# Patient Record
Sex: FEMALE | Race: WHITE | Employment: UNEMPLOYED | URBAN - NONMETROPOLITAN AREA
[De-identification: names, ages, dates, MRNs, and addresses within clinical notes are randomized per-mention and may not be internally consistent; named-entity substitution may affect disease eponyms.]

---

## 2017-01-28 ENCOUNTER — OFFICE VISIT (OUTPATIENT)
Dept: PRIMARY CARE CLINIC | Age: 47
End: 2017-01-28

## 2017-01-28 VITALS
WEIGHT: 152.4 LBS | BODY MASS INDEX: 26.02 KG/M2 | SYSTOLIC BLOOD PRESSURE: 110 MMHG | OXYGEN SATURATION: 98 % | TEMPERATURE: 97.6 F | HEIGHT: 64 IN | DIASTOLIC BLOOD PRESSURE: 70 MMHG | RESPIRATION RATE: 14 BRPM | HEART RATE: 66 BPM

## 2017-01-28 DIAGNOSIS — R11.0 NAUSEA: ICD-10-CM

## 2017-01-28 DIAGNOSIS — K52.9 GASTROENTERITIS: Primary | ICD-10-CM

## 2017-01-28 PROCEDURE — 99202 OFFICE O/P NEW SF 15 MIN: CPT | Performed by: PHYSICIAN ASSISTANT

## 2017-01-28 RX ORDER — ONDANSETRON 8 MG/1
8 TABLET, ORALLY DISINTEGRATING ORAL EVERY 8 HOURS PRN
Qty: 10 TABLET | Refills: 0 | Status: SHIPPED | OUTPATIENT
Start: 2017-01-28 | End: 2022-06-06

## 2017-01-28 ASSESSMENT — ENCOUNTER SYMPTOMS
SHORTNESS OF BREATH: 0
ABDOMINAL PAIN: 0
NAUSEA: 1
SORE THROAT: 0
VOMITING: 1
COUGH: 0
ABDOMINAL DISTENTION: 0
DIARRHEA: 0

## 2019-04-29 ENCOUNTER — HOSPITAL ENCOUNTER (OUTPATIENT)
Age: 49
Discharge: HOME | End: 2019-04-29
Payer: COMMERCIAL

## 2019-04-29 ENCOUNTER — HOSPITAL ENCOUNTER (OUTPATIENT)
Dept: GENERAL RADIOLOGY | Age: 49
Discharge: HOME | End: 2019-04-29
Payer: COMMERCIAL

## 2019-04-29 ENCOUNTER — HOSPITAL ENCOUNTER (OUTPATIENT)
Age: 49
End: 2019-04-29

## 2019-04-29 DIAGNOSIS — M54.2 NECK PAIN: ICD-10-CM

## 2019-04-29 PROCEDURE — 72040 X-RAY EXAM NECK SPINE 2-3 VW: CPT

## 2021-03-29 ENCOUNTER — HOSPITAL ENCOUNTER (OUTPATIENT)
Dept: MAMMOGRAPHY | Age: 51
Discharge: HOME | End: 2021-03-29
Payer: COMMERCIAL

## 2021-03-29 DIAGNOSIS — Z12.39 SCREENING BREAST EXAMINATION: ICD-10-CM

## 2021-03-29 PROCEDURE — 77063 BREAST TOMOSYNTHESIS BI: CPT

## 2021-04-05 ENCOUNTER — HOSPITAL ENCOUNTER (OUTPATIENT)
Dept: WOMENS IMAGING | Age: 51
Discharge: HOME | End: 2021-04-05
Payer: COMMERCIAL

## 2021-04-05 DIAGNOSIS — R92.2 BREAST DENSITY: ICD-10-CM

## 2021-04-05 PROCEDURE — 76642 ULTRASOUND BREAST LIMITED: CPT

## 2021-04-05 PROCEDURE — G0279 TOMOSYNTHESIS, MAMMO: HCPCS

## 2021-05-06 ENCOUNTER — HOSPITAL ENCOUNTER (OUTPATIENT)
Dept: AUDIOLOGY | Age: 51
Discharge: HOME | End: 2021-05-06
Payer: COMMERCIAL

## 2021-05-06 PROCEDURE — 92557 COMPREHENSIVE HEARING TEST: CPT | Performed by: AUDIOLOGIST

## 2021-05-06 PROCEDURE — 92567 TYMPANOMETRY: CPT | Performed by: AUDIOLOGIST

## 2021-05-07 NOTE — PROGRESS NOTES
AUDIOLOGICAL EVALUATION      REASON FOR TESTING:  Audiometric evaluation per the request of Estephanie KEARNS, due to the diagnosis of hearing loss. The patient notes bilateral tinnitus, with the left ear being worse, since November 2020. She experiences difficulty hearing certain tones. She also experiences otalgia with wind. She denies any vertigo. OTOSCOPY: WNL for both ears. AUDIOGRAM            Reliability: Good  Audiometer Used:  GSI-61    COMMENTS: Borderline normal hearing sensitivity for both ears. Speech discrimination ability is excellent at 100%, bilaterally. Tympanometry revealed normal peak pressure and normal middle ear compliance for both ears. RECOMMENDATION(S):   1- ENT consult could be considered if otalgia persists. 2- Repeat audiogram and tympanogram annually for monitoring purposes or sooner if changes are noted in hearing or tinnitus. 3- The patient was given an [employer] brochure on tinnitus.

## 2021-10-14 ENCOUNTER — HOSPITAL ENCOUNTER (OUTPATIENT)
Dept: WOMENS IMAGING | Age: 51
Discharge: HOME | End: 2021-10-14
Payer: COMMERCIAL

## 2021-10-14 DIAGNOSIS — N60.01 BREAST CYST, RIGHT: ICD-10-CM

## 2021-10-14 DIAGNOSIS — Z09 FOLLOW UP: ICD-10-CM

## 2021-10-14 PROCEDURE — 76642 ULTRASOUND BREAST LIMITED: CPT

## 2021-10-16 ENCOUNTER — HOSPITAL ENCOUNTER (OUTPATIENT)
Age: 51
Discharge: HOME | End: 2021-10-16
Payer: COMMERCIAL

## 2021-10-16 DIAGNOSIS — F41.9 ANXIETY: ICD-10-CM

## 2021-10-16 LAB — TSH SERPL DL<=0.05 MIU/L-ACNC: 1.53 UIU/ML (ref 0.4–4.2)

## 2021-10-16 PROCEDURE — 84443 ASSAY THYROID STIM HORMONE: CPT

## 2021-10-16 PROCEDURE — 36415 COLL VENOUS BLD VENIPUNCTURE: CPT

## 2021-10-17 ENCOUNTER — APPOINTMENT (OUTPATIENT)
Dept: ULTRASOUND IMAGING | Age: 51
End: 2021-10-17
Payer: COMMERCIAL

## 2021-10-17 ENCOUNTER — APPOINTMENT (OUTPATIENT)
Dept: CT IMAGING | Age: 51
End: 2021-10-17
Payer: COMMERCIAL

## 2021-10-17 ENCOUNTER — HOSPITAL ENCOUNTER (EMERGENCY)
Age: 51
Discharge: HOME | End: 2021-10-17
Attending: STUDENT IN AN ORGANIZED HEALTH CARE EDUCATION/TRAINING PROGRAM
Payer: COMMERCIAL

## 2021-10-17 VITALS
WEIGHT: 152 LBS | HEART RATE: 90 BPM | RESPIRATION RATE: 16 BRPM | HEIGHT: 65 IN | TEMPERATURE: 98.4 F | BODY MASS INDEX: 25.33 KG/M2 | DIASTOLIC BLOOD PRESSURE: 64 MMHG | OXYGEN SATURATION: 97 % | SYSTOLIC BLOOD PRESSURE: 99 MMHG

## 2021-10-17 DIAGNOSIS — N94.9 ADNEXAL CYST: Primary | ICD-10-CM

## 2021-10-17 DIAGNOSIS — N83.202 CYST OF LEFT OVARY: ICD-10-CM

## 2021-10-17 LAB
ALBUMIN SERPL-MCNC: 4.4 G/DL (ref 3.5–5.1)
ALP BLD-CCNC: 19 U/L (ref 38–126)
ALT SERPL-CCNC: 15 U/L (ref 11–66)
ANION GAP SERPL CALCULATED.3IONS-SCNC: 12 MEQ/L (ref 8–16)
AST SERPL-CCNC: 19 U/L (ref 5–40)
BACTERIA: ABNORMAL /HPF
BASOPHILS # BLD: 0.6 %
BASOPHILS ABSOLUTE: 0 THOU/MM3 (ref 0–0.1)
BILIRUB SERPL-MCNC: 0.5 MG/DL (ref 0.3–1.2)
BILIRUBIN DIRECT: < 0.2 MG/DL (ref 0–0.3)
BILIRUBIN URINE: NEGATIVE
BLOOD, URINE: ABNORMAL
BUN BLDV-MCNC: 11 MG/DL (ref 7–22)
CALCIUM SERPL-MCNC: 8.9 MG/DL (ref 8.5–10.5)
CASTS 2: ABNORMAL /LPF
CASTS UA: ABNORMAL /LPF
CHARACTER, URINE: CLEAR
CHLORIDE BLD-SCNC: 110 MEQ/L (ref 98–111)
CO2: 17 MEQ/L (ref 23–33)
COLOR: YELLOW
CREAT SERPL-MCNC: 0.8 MG/DL (ref 0.4–1.2)
CRYSTALS, UA: ABNORMAL
EOSINOPHIL # BLD: 1.1 %
EOSINOPHILS ABSOLUTE: 0.1 THOU/MM3 (ref 0–0.4)
EPITHELIAL CELLS, UA: ABNORMAL /HPF
ERYTHROCYTE [DISTWIDTH] IN BLOOD BY AUTOMATED COUNT: 12.1 % (ref 11.5–14.5)
ERYTHROCYTE [DISTWIDTH] IN BLOOD BY AUTOMATED COUNT: 40.9 FL (ref 35–45)
GFR SERPL CREATININE-BSD FRML MDRD: 75 ML/MIN/1.73M2
GLUCOSE BLD-MCNC: 94 MG/DL (ref 70–108)
GLUCOSE URINE: NEGATIVE MG/DL
HCT VFR BLD CALC: 37.8 % (ref 37–47)
HEMOGLOBIN: 13 GM/DL (ref 12–16)
IMMATURE GRANS (ABS): 0.02 THOU/MM3 (ref 0–0.07)
IMMATURE GRANULOCYTES: 0.3 %
KETONES, URINE: NEGATIVE
LEUKOCYTE ESTERASE, URINE: NEGATIVE
LYMPHOCYTES # BLD: 26.8 %
LYMPHOCYTES ABSOLUTE: 1.7 THOU/MM3 (ref 1–4.8)
MCH RBC QN AUTO: 31.8 PG (ref 26–33)
MCHC RBC AUTO-ENTMCNC: 34.4 GM/DL (ref 32.2–35.5)
MCV RBC AUTO: 92.4 FL (ref 81–99)
MISCELLANEOUS 2: ABNORMAL
MONOCYTES # BLD: 5.6 %
MONOCYTES ABSOLUTE: 0.3 THOU/MM3 (ref 0.4–1.3)
NITRITE, URINE: NEGATIVE
NUCLEATED RED BLOOD CELLS: 0 /100 WBC
OSMOLALITY CALCULATION: 276.7 MOSMOL/KG (ref 275–300)
PH UA: 6 (ref 5–9)
PLATELET # BLD: 239 THOU/MM3 (ref 130–400)
PMV BLD AUTO: 10 FL (ref 9.4–12.4)
POTASSIUM SERPL-SCNC: 3.4 MEQ/L (ref 3.5–5.2)
PROTEIN UA: NEGATIVE
RBC # BLD: 4.09 MILL/MM3 (ref 4.2–5.4)
RBC URINE: ABNORMAL /HPF
RENAL EPITHELIAL, UA: ABNORMAL
SEG NEUTROPHILS: 65.6 %
SEGMENTED NEUTROPHILS ABSOLUTE COUNT: 4.1 THOU/MM3 (ref 1.8–7.7)
SODIUM BLD-SCNC: 139 MEQ/L (ref 135–145)
SPECIFIC GRAVITY, URINE: 1.01 (ref 1–1.03)
TOTAL PROTEIN: 7.2 G/DL (ref 6.1–8)
UROBILINOGEN, URINE: 0.2 EU/DL (ref 0–1)
WBC # BLD: 6.2 THOU/MM3 (ref 4.8–10.8)
WBC UA: ABNORMAL /HPF
YEAST: ABNORMAL

## 2021-10-17 PROCEDURE — 81001 URINALYSIS AUTO W/SCOPE: CPT

## 2021-10-17 PROCEDURE — 80053 COMPREHEN METABOLIC PANEL: CPT

## 2021-10-17 PROCEDURE — 82248 BILIRUBIN DIRECT: CPT

## 2021-10-17 PROCEDURE — 76830 TRANSVAGINAL US NON-OB: CPT

## 2021-10-17 PROCEDURE — 6360000004 HC RX CONTRAST MEDICATION: Performed by: STUDENT IN AN ORGANIZED HEALTH CARE EDUCATION/TRAINING PROGRAM

## 2021-10-17 PROCEDURE — 85025 COMPLETE CBC W/AUTO DIFF WBC: CPT

## 2021-10-17 PROCEDURE — 74177 CT ABD & PELVIS W/CONTRAST: CPT

## 2021-10-17 PROCEDURE — 99283 EMERGENCY DEPT VISIT LOW MDM: CPT

## 2021-10-17 PROCEDURE — 36415 COLL VENOUS BLD VENIPUNCTURE: CPT

## 2021-10-17 PROCEDURE — 93975 VASCULAR STUDY: CPT

## 2021-10-17 RX ORDER — IBUPROFEN 600 MG/1
600 TABLET ORAL 3 TIMES DAILY PRN
Qty: 30 TABLET | Refills: 0 | Status: SHIPPED | OUTPATIENT
Start: 2021-10-17 | End: 2021-10-17 | Stop reason: SDUPTHER

## 2021-10-17 RX ORDER — IBUPROFEN 600 MG/1
600 TABLET ORAL 3 TIMES DAILY PRN
Qty: 30 TABLET | Refills: 0 | Status: SHIPPED | OUTPATIENT
Start: 2021-10-17 | End: 2022-06-06

## 2021-10-17 RX ADMIN — IOPAMIDOL 80 ML: 755 INJECTION, SOLUTION INTRAVENOUS at 13:39

## 2021-10-17 ASSESSMENT — PAIN DESCRIPTION - PAIN TYPE: TYPE: ACUTE PAIN

## 2021-10-17 ASSESSMENT — PAIN SCALES - GENERAL: PAINLEVEL_OUTOF10: 8

## 2021-10-17 ASSESSMENT — ENCOUNTER SYMPTOMS
SORE THROAT: 0
NAUSEA: 0
ABDOMINAL PAIN: 1
VOMITING: 0
DIARRHEA: 0
WHEEZING: 0
RHINORRHEA: 0
CONSTIPATION: 0
ABDOMINAL DISTENTION: 0
SHORTNESS OF BREATH: 0
COUGH: 0
COLOR CHANGE: 0

## 2021-10-17 ASSESSMENT — PAIN DESCRIPTION - ORIENTATION: ORIENTATION: LOWER

## 2021-10-17 ASSESSMENT — PAIN DESCRIPTION - LOCATION: LOCATION: ABDOMEN

## 2021-10-17 NOTE — ED TRIAGE NOTES
Presents to ED with c/o lower abdominal pain that started a couple weeks ago and is getting worse. Reports nausea.

## 2021-10-17 NOTE — ED NOTES
Patient resting in bed. Respirations easy and unlabored. No distress noted. Call light within reach.        Espinoza Case RN  10/17/21 6005

## 2021-10-17 NOTE — ED PROVIDER NOTES
[address] [address]          Pt Name: Ayana Mckeon  MRN: [id number]  [place] 1970  Date of evaluation: 10/17/2021  Treating Resident Physician: Luis Marie MD  Supervising Physician: Radha Bravo MD    CHIEF COMPLAINT       Chief Complaint   Patient presents with    Abdominal Pain     History obtained from the patient. HISTORY OF PRESENT ILLNESS    HPI  Ayana Mckeon is a 46 y.o. female who presents to the emergency department for evaluation of lower abdominal pain for the last 3 weeks. Patient describes the pain as pressure that is persistent. There are no modifying factors. Patient denies any fever, nausea vomiting diarrhea constipation. The patient has no other acute complaints at this time. REVIEW OF SYSTEMS   Review of Systems   Constitutional: Negative for fatigue and fever. HENT: Negative for ear pain, rhinorrhea and sore throat. Respiratory: Negative for cough, shortness of breath and wheezing. Cardiovascular: Negative for chest pain, palpitations and leg swelling. Gastrointestinal: Positive for abdominal pain. Negative for abdominal distention, constipation, diarrhea, nausea and vomiting. Endocrine: Negative for polydipsia and polyuria. Genitourinary: Negative for difficulty urinating and dysuria. Musculoskeletal: Negative for arthralgias. Skin: Negative for color change, pallor and rash. Neurological: Negative for dizziness, seizures, syncope, weakness and numbness. PAST MEDICAL AND SURGICAL HISTORY   History reviewed. No pertinent past medical history. Past Surgical History:   Procedure Laterality Date     SECTION       and 44    HYSTERECTOMY      age 27         MEDICATIONS   No current facility-administered medications for this encounter.     Current Outpatient Medications:     ibuprofen (ADVIL;MOTRIN) 600 MG tablet, Take 1 tablet by mouth 3 times daily as needed for Pain, Disp: 30 tablet, Rfl: 0    DULoxetine (CYMBALTA) 20 MG extended release capsule, Take 1 capsule by mouth daily, Disp: 30 capsule, Rfl: 1    ciprofloxacin (CIPRO) 500 MG tablet, Take 1 tablet by mouth 2 times daily for 7 days, Disp: 14 tablet, Rfl: 0    metroNIDAZOLE (FLAGYL) 500 MG tablet, Take 1 tablet by mouth 2 times daily for 7 days, Disp: 14 tablet, Rfl: 0    topiramate ER (TROKENDI XR) 100 MG CP24, TAKE 1 CAPSULE BY MOUTH EVERY DAY, Disp: 90 capsule, Rfl: 3    ondansetron (ZOFRAN ODT) 8 MG disintegrating tablet, Take 1 tablet by mouth every 8 hours as needed for Nausea (Patient not taking: Reported on 10/5/2021), Disp: 10 tablet, Rfl: 0    acetaminophen (TYLENOL) 500 MG tablet, Take 500 mg by mouth every 6 hours as needed for Pain (Patient not taking: Reported on 10/5/2021), Disp: , Rfl:       SOCIAL HISTORY     Social History     Social History Narrative    Not on file     Social History     Tobacco Use    Smoking status: Never Smoker    Smokeless tobacco: Never Used   Substance Use Topics    Alcohol use: Yes     Comment: social use    Drug use: No         ALLERGIES   No Known Allergies      FAMILY HISTORY     Family History   Problem Relation Age of Onset    Cancer Mother     Lung Cancer Mother     Esophageal Cancer Father     Breast Cancer Maternal Aunt 54    Breast Cancer Paternal Aunt 76         PREVIOUS RECORDS   Previous records reviewed: today    PHYSICAL EXAM     ED Triage Vitals [10/17/21 1133]   BP Temp Temp Source Pulse Resp SpO2 Height Weight   (!) 142/67 98.4 °F (36.9 °C) Oral 90 16 98 % 5' 5\" (1.651 m) 152 lb (68.9 kg)     Initial vital signs and nursing assessment reviewed and abnormal from hypertension . Body mass index is 25.29 kg/m². Pulsoximetry is normal per my interpretation. Additional Vital Signs:  Vitals:    10/17/21 1445   BP: 99/64   Pulse:    Resp:    Temp:    SpO2: 97%       Physical Exam  Constitutional:       Appearance: Normal appearance.    HENT:      Head: Normocephalic. Right Ear: External ear normal.      Left Ear: External ear normal.      Nose: Nose normal.      Mouth/Throat:      Mouth: Mucous membranes are moist.      Pharynx: Oropharynx is clear. Eyes:      Extraocular Movements: Extraocular movements intact. Conjunctiva/sclera: Conjunctivae normal.      Pupils: Pupils are equal, round, and reactive to light. Cardiovascular:      Rate and Rhythm: Normal rate and regular rhythm. Pulses: Normal pulses. Heart sounds: Normal heart sounds. Pulmonary:      Effort: Pulmonary effort is normal.      Breath sounds: Normal breath sounds. Abdominal:      General: Bowel sounds are normal.      Palpations: Abdomen is soft. Tenderness: There is abdominal tenderness in the right lower quadrant, suprapubic area and left lower quadrant. Musculoskeletal:         General: Normal range of motion. Cervical back: Normal range of motion and neck supple. Skin:     General: Skin is warm and dry. Capillary Refill: Capillary refill takes less than 2 seconds. Neurological:      General: No focal deficit present. Mental Status: She is alert and oriented to person, place, and time. MEDICAL DECISION MAKING   Initial Assessment:   Patient is a 61-year-old female who presents today to the ED with complaint of lower abdominal pain for the last 3 weeks. Patient describes the pain as persistent pressure to lower abdomen. On exam patient noted to have mild tenderness to lower left and right abdomen. Patient initial diagnosis includes but is not limited to ovarian cyst, ovarian torsion, pregnancy, threatened , pelvic mass, appendicitis, constipation, UTI, bowel obstruction.       Plan:   Labs  CT of abdomen and pelvis  Pain management          ED RESULTS   Laboratory results:  Labs Reviewed   CBC WITH AUTO DIFFERENTIAL - Abnormal; Notable for the following components:       Result Value    RBC 4.09 (*)     Monocytes Absolute 0.3 (*)     All other components within normal limits   BASIC METABOLIC PANEL - Abnormal; Notable for the following components:    Potassium 3.4 (*)     CO2 17 (*)     All other components within normal limits   HEPATIC FUNCTION PANEL - Abnormal; Notable for the following components:    Alkaline Phosphatase 19 (*)     All other components within normal limits   URINE WITH REFLEXED MICRO - Abnormal; Notable for the following components:    Blood, Urine TRACE (*)     All other components within normal limits   GLOMERULAR FILTRATION RATE, ESTIMATED - Abnormal; Notable for the following components:    Est, Glom Filt Rate 75 (*)     All other components within normal limits   ANION GAP   OSMOLALITY       Radiologic studies results:  US NON OB TRANSVAGINAL   Final Result   1. 2 large cysts involving the left ovary one is completely sonolucent and the other has some internal debris suggesting a small hemorrhagic cyst.   2. No evidence of torsion. 3. Physiologic follicle involving the right ovary. 4. 2 adjacent large cysts in the right adnexa as detailed above. **This report has been created using voice recognition software. It may contain minor errors which are inherent in voice recognition technology. **      Final report electronically signed by Dr. Shantell Quiroz on 10/17/2021 5:00 PM      US DUP ABD PEL RETRO SCROT COMPLETE   Final Result   1. 2 large cysts involving the left ovary one is completely sonolucent and the other has some internal debris suggesting a small hemorrhagic cyst.   2. No evidence of torsion. 3. Physiologic follicle involving the right ovary. 4. 2 adjacent large cysts in the right adnexa as detailed above. **This report has been created using voice recognition software. It may contain minor errors which are inherent in voice recognition technology. **      Final report electronically signed by Dr. Shantell Quiroz on 10/17/2021 5:00 PM      CT ABDOMEN PELVIS W IV CONTRAST Additional Contrast? None   Final Result   Bilateral large and multiple adnexal cystic masses. **This report has been created using voice recognition software. It may contain minor errors which are inherent in voice recognition technology. **      Final report electronically signed by Dr. Ronnie Marshall on 10/17/2021 1:56 PM          ED Medications administered this visit:   Medications   iopamidol (ISOVUE-370) 76 % injection 80 mL (80 mLs IntraVENous Given 10/17/21 1339)         ED COURSE      Patient had ultrasounds that were noted to both left ovary and adnexal cyst.  Discussed this with patient importance of follow-up with OB/Gyn. Patient verbalized understanding will be discharged home at this time. Strict return precautions and follow up instructions were discussed with the patient prior to discharge, with which the patient agrees. MEDICATION CHANGES     Discharge Medication List as of 10/17/2021  5:27 PM      START taking these medications    Details   ibuprofen (ADVIL;MOTRIN) 600 MG tablet Take 1 tablet by mouth 3 times daily as needed for Pain, Disp-30 tablet, R-0Normal               FINAL DISPOSITION     Final diagnoses:   Adnexal cyst   Cyst of left ovary     Condition: condition: good  Dispo: Discharge to home      This transcription was electronically signed. Parts of this transcriptions may have been dictated by use of voice recognition software and electronically transcribed, and parts may have been transcribed with the assistance of an ED scribe. The transcription may contain errors not detected in proofreading. Please refer to my supervising physician's documentation if my documentation differs.     Electronically Signed: George Maddox MD, 10/17/21, 7:43 PM       George Maddox MD  Resident  10/22/21 8750

## 2021-10-17 NOTE — ED PROVIDER NOTES
ekg results available at the moment. Diagnosis, treatment and disposition plans were discussed and agreed upon by patient. This transcription was electronically signed. It was dictated by use of voice recognition software and electronically transcribed. The transcription may contain errors not detected in proofreading.      I performed direct supervision and was present for the critical portion following procedures: None  Critical care time on this case: None    Electronically signed by Prabha Wong MD on 10/17/21 at 7:45 PM EDT        Prabha Wong MD  10/17/21 1942

## 2021-10-21 ENCOUNTER — HOSPITAL ENCOUNTER (OUTPATIENT)
Dept: WOMENS IMAGING | Age: 51
Discharge: HOME | End: 2021-10-21
Payer: COMMERCIAL

## 2021-10-21 DIAGNOSIS — N63.14 MASS OF LOWER INNER QUADRANT OF RIGHT BREAST: ICD-10-CM

## 2021-10-21 PROCEDURE — 2709999900 MAM US GUID NDL BIOPSY RIGHT

## 2021-10-21 PROCEDURE — 88305 TISSUE EXAM BY PATHOLOGIST: CPT

## 2021-10-21 PROCEDURE — 77065 DX MAMMO INCL CAD UNI: CPT

## 2021-10-21 NOTE — PROGRESS NOTES
[facility] [address]  Pre-Biopsy Assessment      Patient Education    Written information about procedure Yes  right   Procedural steps explained Yes Ultrasound Biopsy   Post-op potential: bruising, hematoma, pain Yes    Self-care: activity, care of dressing Yes    Patient verbalized understanding Yes    Consent signed and witnessed Yes      Hormone Therapy Status: none    Recent Medication: N/A Last Dose: none                                     Hormone Replacement Therapy: no    Previous Breast Biopsy: no    Previous Diagnosis Cancer: no    Hysterectomy:yes - partial hysterectomy at age 27 for dysplasia    Emotional Status: Nervous    Language or Physical Barriers: none    Comments: none      Electronically signed by Ayo Hamilton on 10/21/2021 at 9:25 AM

## 2021-10-22 ENCOUNTER — CLINICAL DOCUMENTATION (OUTPATIENT)
Dept: WOMENS IMAGING | Age: 51
End: 2021-10-22

## 2021-10-22 NOTE — PROGRESS NOTES
Contact Type :    Telephone  Notes :  After consulting with Dr. Jaswinder Blunt was contacted by telephone. She was informed of the negative biopsy results and the need to return for a 6 month follow up. She voiced understanding. Biopsy site: doing well     Results faxed to: SARA Santos

## 2021-12-11 ENCOUNTER — HOSPITAL ENCOUNTER (OUTPATIENT)
Age: 51
Discharge: HOME | End: 2021-12-11
Payer: COMMERCIAL

## 2021-12-11 LAB
BASOPHILS # BLD: 0.7 %
BASOPHILS ABSOLUTE: 0 THOU/MM3 (ref 0–0.1)
EOSINOPHIL # BLD: 2.3 %
EOSINOPHILS ABSOLUTE: 0.1 THOU/MM3 (ref 0–0.4)
ERYTHROCYTE [DISTWIDTH] IN BLOOD BY AUTOMATED COUNT: 12.4 % (ref 11.5–14.5)
ERYTHROCYTE [DISTWIDTH] IN BLOOD BY AUTOMATED COUNT: 43.9 FL (ref 35–45)
HCT VFR BLD CALC: 39.8 % (ref 37–47)
HEMOGLOBIN: 13.2 GM/DL (ref 12–16)
IMMATURE GRANS (ABS): 0.03 THOU/MM3 (ref 0–0.07)
IMMATURE GRANULOCYTES: 0.5 %
LYMPHOCYTES # BLD: 39.4 %
LYMPHOCYTES ABSOLUTE: 2.4 THOU/MM3 (ref 1–4.8)
MCH RBC QN AUTO: 32 PG (ref 26–33)
MCHC RBC AUTO-ENTMCNC: 33.2 GM/DL (ref 32.2–35.5)
MCV RBC AUTO: 96.4 FL (ref 81–99)
MONOCYTES # BLD: 6.2 %
MONOCYTES ABSOLUTE: 0.4 THOU/MM3 (ref 0.4–1.3)
NUCLEATED RED BLOOD CELLS: 0 /100 WBC
PLATELET # BLD: 253 THOU/MM3 (ref 130–400)
PMV BLD AUTO: 10.1 FL (ref 9.4–12.4)
RBC # BLD: 4.13 MILL/MM3 (ref 4.2–5.4)
SEG NEUTROPHILS: 50.9 %
SEGMENTED NEUTROPHILS ABSOLUTE COUNT: 3.1 THOU/MM3 (ref 1.8–7.7)
WBC # BLD: 6.1 THOU/MM3 (ref 4.8–10.8)

## 2021-12-11 PROCEDURE — 85025 COMPLETE CBC W/AUTO DIFF WBC: CPT

## 2021-12-11 PROCEDURE — 36415 COLL VENOUS BLD VENIPUNCTURE: CPT

## 2022-01-04 NOTE — PROGRESS NOTES
PAT call attempted, patient unavailable, left message to please call us back at your earliest convenience; [phone]

## 2022-01-04 NOTE — PROGRESS NOTES
Following instructions given to patient, who states understanding:     NPO after midnight  [employer] and 's license  Wear comfortable clean clothing  Do not bring jewelry   Shower night before and morning of surgery with a liquid antibacterial soap  Bring medications in original bottles  Follow all instructions given by your physician   needed at discharge  Call [phone] for any questions  Report to SDS on 2nd floor  If you would become ill prior to surgery, please call the surgeon  Please bring and wear mask

## 2022-01-04 NOTE — PROGRESS NOTES
In preparation for their surgical procedure above patient was screened for Obstructive Sleep Apnea (DOMINIC) using the STOP-Bang Questionnaire by the Pre-Admission Testing department. This is a pre-surgical screening tool for patient safety and serves as a recommendation, this WILL NOT cause cancellation of surgery. STOP-Bang Questionnaire  * Do you currently see a pulmonologist?  No     If yes STOP, do not complete. Patient follows with  .    1. Do you snore loudly (able to be heard in the next room)? No    2. Do you often feel tired or sleepy during the daytime? No       3. Has anyone ever told you that you stop breathing during your sleep? No    4. Do you have or are you being treated for high blood pressure? No      5. BMI more than 35? BMI (Calculated): 25.3        No    6. Age over 48 years? 46 y.o. Yes    7. Neck Circumference greater than 17 inches for male or 16 inches for female? Measured           (visits only)            Not Applicable    8. Gender Male? No      TOTAL SCORE: 1    DOMINIC - Low Risk : Yes to 0 - 2 questions  DOMINIC - Intermediate Risk : Yes to 3 - 4 questions  DOMINIC - High Risk : Yes to 5 - 8 questions    Adapted from:   STOP Questionnaire: A Tool to Screen Patients for Obstructive Sleep Apnea   [facility] Gerardo Boland M.B.B.S., Dorenda Olszewski, M.D., [facility]. Talya Isaac, Ph.D., [facility] , M.B.B.S., Prabha Almeida M.Sc., Juan Carlos Bullock M.D., [facility]. [facility]    Anesthesiology 2008; [id number] Copyright 2008, the [address] [employer], [place] [address].   ----------------------------------------------------------------------------------------------------------------

## 2022-01-10 ENCOUNTER — ANESTHESIA EVENT (OUTPATIENT)
Dept: OPERATING ROOM | Age: 52
End: 2022-01-10
Payer: COMMERCIAL

## 2022-01-11 ENCOUNTER — HOSPITAL ENCOUNTER (OUTPATIENT)
Age: 52
Setting detail: OUTPATIENT SURGERY
Discharge: HOME | End: 2022-01-11
Attending: OBSTETRICS & GYNECOLOGY | Admitting: OBSTETRICS & GYNECOLOGY
Payer: COMMERCIAL

## 2022-01-11 ENCOUNTER — ANESTHESIA (OUTPATIENT)
Dept: OPERATING ROOM | Age: 52
End: 2022-01-11
Payer: COMMERCIAL

## 2022-01-11 VITALS
HEIGHT: 65 IN | WEIGHT: 152.4 LBS | HEART RATE: 69 BPM | SYSTOLIC BLOOD PRESSURE: 94 MMHG | RESPIRATION RATE: 16 BRPM | TEMPERATURE: 97.2 F | DIASTOLIC BLOOD PRESSURE: 55 MMHG | OXYGEN SATURATION: 98 % | BODY MASS INDEX: 25.39 KG/M2

## 2022-01-11 VITALS — DIASTOLIC BLOOD PRESSURE: 58 MMHG | SYSTOLIC BLOOD PRESSURE: 124 MMHG | TEMPERATURE: 97.3 F | OXYGEN SATURATION: 98 %

## 2022-01-11 DIAGNOSIS — R10.2 PELVIC PAIN: Primary | ICD-10-CM

## 2022-01-11 PROCEDURE — S2900 ROBOTIC SURGICAL SYSTEM: HCPCS | Performed by: OBSTETRICS & GYNECOLOGY

## 2022-01-11 PROCEDURE — 2709999900 HC NON-CHARGEABLE SUPPLY: Performed by: OBSTETRICS & GYNECOLOGY

## 2022-01-11 PROCEDURE — 88305 TISSUE EXAM BY PATHOLOGIST: CPT

## 2022-01-11 PROCEDURE — 7100000011 HC PHASE II RECOVERY - ADDTL 15 MIN: Performed by: OBSTETRICS & GYNECOLOGY

## 2022-01-11 PROCEDURE — 3600000019 HC SURGERY ROBOT ADDTL 15MIN: Performed by: OBSTETRICS & GYNECOLOGY

## 2022-01-11 PROCEDURE — 7100000000 HC PACU RECOVERY - FIRST 15 MIN: Performed by: OBSTETRICS & GYNECOLOGY

## 2022-01-11 PROCEDURE — 2580000003 HC RX 258: Performed by: OBSTETRICS & GYNECOLOGY

## 2022-01-11 PROCEDURE — 2500000003 HC RX 250 WO HCPCS: Performed by: REGISTERED NURSE

## 2022-01-11 PROCEDURE — 6360000002 HC RX W HCPCS: Performed by: STUDENT IN AN ORGANIZED HEALTH CARE EDUCATION/TRAINING PROGRAM

## 2022-01-11 PROCEDURE — 6360000002 HC RX W HCPCS: Performed by: REGISTERED NURSE

## 2022-01-11 PROCEDURE — 7100000010 HC PHASE II RECOVERY - FIRST 15 MIN: Performed by: OBSTETRICS & GYNECOLOGY

## 2022-01-11 PROCEDURE — 2500000003 HC RX 250 WO HCPCS: Performed by: OBSTETRICS & GYNECOLOGY

## 2022-01-11 PROCEDURE — 3700000000 HC ANESTHESIA ATTENDED CARE: Performed by: OBSTETRICS & GYNECOLOGY

## 2022-01-11 PROCEDURE — 3600000009 HC SURGERY ROBOT BASE: Performed by: OBSTETRICS & GYNECOLOGY

## 2022-01-11 PROCEDURE — 6370000000 HC RX 637 (ALT 250 FOR IP)

## 2022-01-11 PROCEDURE — 3700000001 HC ADD 15 MINUTES (ANESTHESIA): Performed by: OBSTETRICS & GYNECOLOGY

## 2022-01-11 PROCEDURE — 7100000001 HC PACU RECOVERY - ADDTL 15 MIN: Performed by: OBSTETRICS & GYNECOLOGY

## 2022-01-11 PROCEDURE — C1765 ADHESION BARRIER: HCPCS | Performed by: OBSTETRICS & GYNECOLOGY

## 2022-01-11 PROCEDURE — 6360000002 HC RX W HCPCS

## 2022-01-11 RX ORDER — IBUPROFEN 400 MG/1
800 TABLET ORAL EVERY 8 HOURS PRN
Status: CANCELLED | OUTPATIENT
Start: 2022-01-11

## 2022-01-11 RX ORDER — ONDANSETRON 2 MG/ML
INJECTION INTRAMUSCULAR; INTRAVENOUS PRN
Status: DISCONTINUED | OUTPATIENT
Start: 2022-01-11 | End: 2022-01-11 | Stop reason: SDUPTHER

## 2022-01-11 RX ORDER — FENTANYL CITRATE 50 UG/ML
INJECTION, SOLUTION INTRAMUSCULAR; INTRAVENOUS PRN
Status: DISCONTINUED | OUTPATIENT
Start: 2022-01-11 | End: 2022-01-11 | Stop reason: SDUPTHER

## 2022-01-11 RX ORDER — ACETAMINOPHEN 325 MG/1
650 TABLET ORAL EVERY 4 HOURS PRN
Status: CANCELLED | OUTPATIENT
Start: 2022-01-11

## 2022-01-11 RX ORDER — DEXAMETHASONE SODIUM PHOSPHATE 10 MG/ML
INJECTION, EMULSION INTRAMUSCULAR; INTRAVENOUS PRN
Status: DISCONTINUED | OUTPATIENT
Start: 2022-01-11 | End: 2022-01-11 | Stop reason: SDUPTHER

## 2022-01-11 RX ORDER — OXYCODONE HYDROCHLORIDE AND ACETAMINOPHEN 5; 325 MG/1; MG/1
1 TABLET ORAL ONCE
Status: CANCELLED | OUTPATIENT
Start: 2022-01-11

## 2022-01-11 RX ORDER — SODIUM CHLORIDE 9 MG/ML
INJECTION, SOLUTION INTRAVENOUS CONTINUOUS
Status: DISCONTINUED | OUTPATIENT
Start: 2022-01-11 | End: 2022-01-11 | Stop reason: HOSPADM

## 2022-01-11 RX ORDER — PROPOFOL 10 MG/ML
INJECTION, EMULSION INTRAVENOUS PRN
Status: DISCONTINUED | OUTPATIENT
Start: 2022-01-11 | End: 2022-01-11 | Stop reason: SDUPTHER

## 2022-01-11 RX ORDER — OXYCODONE HYDROCHLORIDE AND ACETAMINOPHEN 5; 325 MG/1; MG/1
TABLET ORAL
Status: COMPLETED
Start: 2022-01-11 | End: 2022-01-11

## 2022-01-11 RX ORDER — BUPIVACAINE HYDROCHLORIDE 5 MG/ML
INJECTION, SOLUTION EPIDURAL; INTRACAUDAL PRN
Status: DISCONTINUED | OUTPATIENT
Start: 2022-01-11 | End: 2022-01-11 | Stop reason: ALTCHOICE

## 2022-01-11 RX ORDER — OXYCODONE HYDROCHLORIDE AND ACETAMINOPHEN 5; 325 MG/1; MG/1
1 TABLET ORAL EVERY 6 HOURS PRN
Qty: 28 TABLET | Refills: 0 | Status: SHIPPED | OUTPATIENT
Start: 2022-01-11 | End: 2022-01-18

## 2022-01-11 RX ORDER — FENTANYL CITRATE 50 UG/ML
50 INJECTION, SOLUTION INTRAMUSCULAR; INTRAVENOUS EVERY 5 MIN PRN
Status: DISCONTINUED | OUTPATIENT
Start: 2022-01-11 | End: 2022-01-11 | Stop reason: HOSPADM

## 2022-01-11 RX ORDER — SODIUM CHLORIDE 0.9 % (FLUSH) 0.9 %
10 SYRINGE (ML) INJECTION PRN
Status: CANCELLED | OUTPATIENT
Start: 2022-01-11

## 2022-01-11 RX ORDER — SODIUM CHLORIDE 9 MG/ML
25 INJECTION, SOLUTION INTRAVENOUS PRN
Status: CANCELLED | OUTPATIENT
Start: 2022-01-11

## 2022-01-11 RX ORDER — KETOROLAC TROMETHAMINE 30 MG/ML
30 INJECTION, SOLUTION INTRAMUSCULAR; INTRAVENOUS EVERY 6 HOURS
Status: CANCELLED | OUTPATIENT
Start: 2022-01-11 | End: 2022-01-13

## 2022-01-11 RX ORDER — FENTANYL CITRATE 50 UG/ML
INJECTION, SOLUTION INTRAMUSCULAR; INTRAVENOUS
Status: COMPLETED
Start: 2022-01-11 | End: 2022-01-11

## 2022-01-11 RX ORDER — SODIUM CHLORIDE, SODIUM LACTATE, POTASSIUM CHLORIDE, CALCIUM CHLORIDE 600; 310; 30; 20 MG/100ML; MG/100ML; MG/100ML; MG/100ML
INJECTION, SOLUTION INTRAVENOUS CONTINUOUS
Status: CANCELLED | OUTPATIENT
Start: 2022-01-11

## 2022-01-11 RX ORDER — ONDANSETRON 4 MG/1
4 TABLET, ORALLY DISINTEGRATING ORAL EVERY 8 HOURS PRN
Status: CANCELLED | OUTPATIENT
Start: 2022-01-11

## 2022-01-11 RX ORDER — ROCURONIUM BROMIDE 10 MG/ML
INJECTION, SOLUTION INTRAVENOUS PRN
Status: DISCONTINUED | OUTPATIENT
Start: 2022-01-11 | End: 2022-01-11 | Stop reason: SDUPTHER

## 2022-01-11 RX ORDER — MIDAZOLAM HYDROCHLORIDE 1 MG/ML
INJECTION INTRAMUSCULAR; INTRAVENOUS PRN
Status: DISCONTINUED | OUTPATIENT
Start: 2022-01-11 | End: 2022-01-11 | Stop reason: SDUPTHER

## 2022-01-11 RX ORDER — ONDANSETRON 2 MG/ML
4 INJECTION INTRAMUSCULAR; INTRAVENOUS EVERY 6 HOURS PRN
Status: CANCELLED | OUTPATIENT
Start: 2022-01-11

## 2022-01-11 RX ORDER — SODIUM CHLORIDE 0.9 % (FLUSH) 0.9 %
10 SYRINGE (ML) INJECTION EVERY 12 HOURS SCHEDULED
Status: CANCELLED | OUTPATIENT
Start: 2022-01-11

## 2022-01-11 RX ORDER — OXYCODONE HYDROCHLORIDE AND ACETAMINOPHEN 5; 325 MG/1; MG/1
1 TABLET ORAL EVERY 4 HOURS PRN
Status: CANCELLED | OUTPATIENT
Start: 2022-01-11

## 2022-01-11 RX ORDER — OXYCODONE HYDROCHLORIDE AND ACETAMINOPHEN 5; 325 MG/1; MG/1
2 TABLET ORAL EVERY 4 HOURS PRN
Status: CANCELLED | OUTPATIENT
Start: 2022-01-11

## 2022-01-11 RX ORDER — KETOROLAC TROMETHAMINE 30 MG/ML
INJECTION, SOLUTION INTRAMUSCULAR; INTRAVENOUS PRN
Status: DISCONTINUED | OUTPATIENT
Start: 2022-01-11 | End: 2022-01-11 | Stop reason: SDUPTHER

## 2022-01-11 RX ADMIN — KETOROLAC TROMETHAMINE 30 MG: 30 INJECTION, SOLUTION INTRAMUSCULAR; INTRAVENOUS at 10:04

## 2022-01-11 RX ADMIN — SODIUM CHLORIDE: 9 INJECTION, SOLUTION INTRAVENOUS at 09:33

## 2022-01-11 RX ADMIN — FENTANYL CITRATE 50 MCG: 50 INJECTION INTRAMUSCULAR; INTRAVENOUS at 10:12

## 2022-01-11 RX ADMIN — FENTANYL CITRATE 50 MCG: 50 INJECTION, SOLUTION INTRAMUSCULAR; INTRAVENOUS at 10:35

## 2022-01-11 RX ADMIN — SUGAMMADEX 200 MG: 100 INJECTION, SOLUTION INTRAVENOUS at 10:09

## 2022-01-11 RX ADMIN — FENTANYL CITRATE 50 MCG: 50 INJECTION, SOLUTION INTRAMUSCULAR; INTRAVENOUS at 10:40

## 2022-01-11 RX ADMIN — SODIUM CHLORIDE: 9 INJECTION, SOLUTION INTRAVENOUS at 08:41

## 2022-01-11 RX ADMIN — Medication 0.5 MG: at 10:30

## 2022-01-11 RX ADMIN — OXYCODONE AND ACETAMINOPHEN 1 TABLET: 5; 325 TABLET ORAL at 12:27

## 2022-01-11 RX ADMIN — ONDANSETRON 4 MG: 2 INJECTION INTRAMUSCULAR; INTRAVENOUS at 08:48

## 2022-01-11 RX ADMIN — DEXAMETHASONE SODIUM PHOSPHATE 4 MG: 10 INJECTION, EMULSION INTRAMUSCULAR; INTRAVENOUS at 08:48

## 2022-01-11 RX ADMIN — PROPOFOL 150 MG: 10 INJECTION, EMULSION INTRAVENOUS at 08:44

## 2022-01-11 RX ADMIN — HYDROMORPHONE HYDROCHLORIDE 0.5 MG: 1 INJECTION, SOLUTION INTRAMUSCULAR; INTRAVENOUS; SUBCUTANEOUS at 10:30

## 2022-01-11 RX ADMIN — FENTANYL CITRATE 50 MCG: 50 INJECTION, SOLUTION INTRAMUSCULAR; INTRAVENOUS at 11:17

## 2022-01-11 RX ADMIN — Medication 60 MG: at 08:44

## 2022-01-11 RX ADMIN — ROCURONIUM BROMIDE 10 MG: 50 INJECTION, SOLUTION INTRAVENOUS at 09:30

## 2022-01-11 RX ADMIN — MIDAZOLAM 2 MG: 1 INJECTION INTRAMUSCULAR; INTRAVENOUS at 08:41

## 2022-01-11 RX ADMIN — FENTANYL CITRATE 50 MCG: 50 INJECTION INTRAMUSCULAR; INTRAVENOUS at 10:24

## 2022-01-11 RX ADMIN — FENTANYL CITRATE 50 MCG: 50 INJECTION INTRAMUSCULAR; INTRAVENOUS at 10:18

## 2022-01-11 RX ADMIN — FENTANYL CITRATE 100 MCG: 50 INJECTION INTRAMUSCULAR; INTRAVENOUS at 08:43

## 2022-01-11 RX ADMIN — ROCURONIUM BROMIDE 50 MG: 50 INJECTION, SOLUTION INTRAVENOUS at 08:44

## 2022-01-11 ASSESSMENT — PAIN SCALES - GENERAL
PAINLEVEL_OUTOF10: 10
PAINLEVEL_OUTOF10: 7
PAINLEVEL_OUTOF10: 7
PAINLEVEL_OUTOF10: 6
PAINLEVEL_OUTOF10: 5
PAINLEVEL_OUTOF10: 6
PAINLEVEL_OUTOF10: 6
PAINLEVEL_OUTOF10: 10
PAINLEVEL_OUTOF10: 7
PAINLEVEL_OUTOF10: 7
PAINLEVEL_OUTOF10: 6
PAINLEVEL_OUTOF10: 6
PAINLEVEL_OUTOF10: 7
PAINLEVEL_OUTOF10: 6

## 2022-01-11 ASSESSMENT — PAIN DESCRIPTION - PAIN TYPE
TYPE: ACUTE PAIN
TYPE: ACUTE PAIN

## 2022-01-11 ASSESSMENT — PAIN DESCRIPTION - LOCATION
LOCATION: ABDOMEN
LOCATION: ABDOMEN

## 2022-01-11 ASSESSMENT — PAIN DESCRIPTION - DESCRIPTORS: DESCRIPTORS: CRAMPING

## 2022-01-11 ASSESSMENT — PAIN DESCRIPTION - ORIENTATION: ORIENTATION: RIGHT;LOWER;LEFT

## 2022-01-11 NOTE — OP NOTE
[address] [place] [address]                                OPERATIVE REPORT    PATIENT NAME: Jeevan Gerber                       :        1970  MED REC NO:   [id number]                           ROOM:  ACCOUNT NO:   [de-identified]                           ADMIT DATE: 2022  PROVIDER:     Alger Chars L. Jackey Duverney, M.D. [place] Credit:  2022    PREOPERATIVE DIAGNOSES:  1.  Pelvic pain. 2.  Cystic adnexa. 3.  Suspicion for hydrosalpinx. POSTOPERATIVE DIAGNOSES:  1.  Pelvic pain. 2.  Cystic adnexa. 3.  Suspicion for hydrosalpinx. PROCEDURES:  Robotic-assisted bilateral salpingo-oophorectomy. ANESTHESIA:  General.    COMPLICATIONS:  None. EBL:  Less than 100. FINDINGS:  Multicystic adnexae bilaterally including ovarian cyst on the  left and likely hydrosalpinx. SURGEON:  Tammy L. Jackey Duverney, MD    ASSISTANT:  Alycia Adams DO    DESCRIPTION OF THE PROCEDURE:  The patient was taken to the operating  room where general anesthesia was found to be adequate. She was prepped  and draped in dorsal lithotomy position with the Meagan stirrups and  secured to the operating room table with beanbag device. Weighted  speculum was placed in the vagina, and sponge stick was placed at the  vaginal cuff. A Harvey catheter was placed within the urinary bladder  and attention was turned to the abdomen. Supraumbilical skin was  injected with Marcaine and incised with a scalpel, and an 8-mm trocar  was placed directly into the abdomen, and it was insufflated with CO2  gas. The pelvis was surveyed and free of anterior abdominal wall  adhesions. Robotic trocars were placed in the left and right upper  quadrant and assistant port in the left lower quadrant. The KoalaDeal  was then side docked on the patient's right after targeting, and the  Force bipolar and monopolar scissors were placed within the robotic port  sites. Surgeon then went to the console for remainder of the  dissection. The left ovary was freed up from the left pelvic sidewall  and vaginal cuff. Sponge stick was used to identify the borders of the  vaginal cuff. The peritoneum was opened to free up the cystic  components and likely hydrosalpinx from the left pelvic sidewall. The  ureter was visualized during this dissection. Once the ovary was freed  up, the infundibulopelvic ligament was ligated and transected and was  placed in the left lower quadrant. Attention was then turned to the  right adnexa. The right ovary was more densely adherent to the vaginal  cuff. The ovary was  from the vaginal cuff using sharp and  blunt dissection, and the cystic area was dissected from the right  pelvic sidewall. The ureter was visualized during this dissection. Once the adnexa was freed up, the infundibulopelvic ligament was ligated  and transected, and the specimen placed in left lower quadrant. The  pelvis was then irrigated with warm normal saline, and hemostasis was  achieved. Both ureters were again visualized and peristalsing. A piece  of Interceed was placed over the raw surface of the vaginal cuff, and  the specimen was placed within an Endopouch bag. The robot was then  undocked, and all robotic instruments were removed at the bedside. The  Endopouch was brought up through the skin incision at the assistant  port, and an incision was made in the cystic component of the specimen. The clear fluid contents were suctioned within the Endopouch bag without  evidence of spill. Once the specimen was decompressed, it was removed  in the Endopouch bag and all sent to Pathology for evaluation. Upon  completion, all port  sites were removed from the abdomen. It was  manually exsufflated. Skin was closed with 4-0 Vicryl subcuticular  stitch. Sponge, lap, needle counts were correct x2. The patient was  taken to recovery room in stable condition.   All instruments were  removed from the vagina.         Anamaria Mackenzie M.D.    D: 01/11/2022 10:22:40       T: 01/11/2022 10:25:18     [email or web address]  Job#: [id number]     Doc#: [id number]    CC:

## 2022-01-11 NOTE — PROGRESS NOTES

## 2022-01-11 NOTE — BRIEF OP NOTE
Brief Operative Report      Pre-operative Diagnosis:  Pelvic pain, B/L adnexal cystic lesions    Post-operative Diagnosis:  Same    Procedure:  RA-BSO    Surgeon: ALBERT Amaro MD     Anesthesia:  General endotrachial anesthesia    Estimated blood loss:  Less than 100 ml     Findings: See Operative Dictation, multicystic adnexa    Complications:  none      See dictated operative report for full details.       Berenda Bamberger, MD

## 2022-01-11 NOTE — PROGRESS NOTES
Patient admitted to [facility] room 7 with sister Jaron Grant at bedside. Bed in low position side rails up call light in reach. Patient denies questions at this time.

## 2022-01-11 NOTE — H&P
Department of  Obstetrics and Gynecology  History and Physical  Date of Admission:  2022    CHIEF COMPLAINT:   Pelvic pain, hydrosalpinx, ovarian cyst    History obtained from patient    HISTORY OF PRESENT ILLNESS:     The patient is a 46 y.o. female with significant past medical history of hysterectomy who presents with worsening pelvic pain and suspected hydrosalpinx. She desires surgical intervention. R/B reviewed    Past Medical History:        Diagnosis Date    Arthritis     Migraines      Past Surgical History:        Procedure Laterality Date    CERVICAL FUSION  2019     SECTION       and 44    HYSTERECTOMY      age 27    TRA US GUID NDL BIOPSY RIGHT Right 10/21/2021    TRA [address] RIGHT 10/21/2021 Powell Ganser, MD [facility]         meds:  Current Facility-Administered Medications:     0.9 % sodium chloride infusion, , IntraVENous, Continuous, Martha Mondragon MD       Allergies:  Patient has no known allergies. Social History:  TOBACCO:   reports that she has never smoked. She has never used smokeless tobacco.  ETOH:   reports previous alcohol use. DRUGS:   reports no history of drug use.     Family History:       Problem Relation Age of Onset    Cancer Mother     Lung Cancer Mother     Esophageal Cancer Father     Breast Cancer Maternal Aunt 54    Breast Cancer Paternal Aunt 76        PHYSICAL EXAM:    Vitals:  /72   Pulse 71   Temp 97.4 °F (36.3 °C) (Temporal)   Resp 16   Ht 5' 5\" (1.651 m)   Wt 152 lb 6.4 oz (69.1 kg)   SpO2 98%   BMI 25.36 kg/m²     CONSTITUTIONAL:  awake, alert, cooperative, no apparent distress, and appears stated age  ABDOMEN:  Soft, NT, ND  GYN PELVIC EXAM: see office exam    DATA:  Labs:    CBC:   Lab Results   Component Value Date    WBC 6.1 2021    RBC 4.13 2021    RBC 4.17 10/08/2021    HGB 13.2 2021    HCT 39.8 2021    MCV 96.4 2021    RDW 13.0 10/08/2021     2021 IMPRESSION/RECOMMENDATIONS:    52yo with pelvic pain  - Plan for RA- Clyde Finch MD

## 2022-01-11 NOTE — ANESTHESIA PRE PROCEDURE
Department of Anesthesiology  Preprocedure Note       Name:  Adry Lewis   Age:  46 y.o.  :  1970                                          MRN:  [id number]         Date:  2022      Surgeon: Angela Truong):  Jamas Lombard, MD    Procedure: Procedure(s):  ROBOTIC BSO    Medications prior to admission:   Prior to Admission medications    Medication Sig Start Date End Date Taking? Authorizing Provider   DULoxetine (CYMBALTA) 20 MG extended release capsule Take 1 capsule by mouth daily 21  Yes Keyla Vale APRN - ADAMA   ibuprofen (ADVIL;MOTRIN) 600 MG tablet Take 1 tablet by mouth 3 times daily as needed for Pain 10/17/21  Yes Hetal Palmer MD   topiramate ER (TROKENDI XR) 100 MG CP24 TAKE 1 CAPSULE BY MOUTH EVERY DAY 10/5/21  Yes Keyla Vale APRN - CNP   ondansetron (ZOFRAN ODT) 8 MG disintegrating tablet Take 1 tablet by mouth every 8 hours as needed for Nausea 17  Yes RAJWINDER Teresa   acetaminophen (TYLENOL) 500 MG tablet Take 500 mg by mouth every 6 hours as needed for Pain    Yes Historical Provider, MD       Current medications:    No current facility-administered medications for this encounter. Allergies:  No Known Allergies    Problem List:  There is no problem list on file for this patient.       Past Medical History:        Diagnosis Date    Arthritis     Migraines        Past Surgical History:        Procedure Laterality Date    CERVICAL FUSION  2019     SECTION      1991 and 44    HYSTERECTOMY      age 27    TRA US GUID NDL BIOPSY RIGHT Right 10/21/2021    TRA [address] RIGHT 10/21/2021 Ifeoma Arita MD [facility]       Social History:    Social History     Tobacco Use    Smoking status: Never Smoker    Smokeless tobacco: Never Used   Substance Use Topics    Alcohol use: Not Currently                                Counseling given: Not Answered      Vital Signs (Current):   Vitals:    22 1535 22 0733   BP: 117/72   Pulse:  71   Resp:  16   Temp:  97.4 °F (36.3 °C)   TempSrc:  Temporal   SpO2:  98%   Weight: 152 lb (68.9 kg) 152 lb 6.4 oz (69.1 kg)   Height: 5' 5\" (1.651 m) 5' 5\" (1.651 m)                                              BP Readings from Last 3 Encounters:   01/11/22 117/72   10/19/21 110/68   10/17/21 99/64       NPO Status:                                                                                 BMI:   Wt Readings from Last 3 Encounters:   01/11/22 152 lb 6.4 oz (69.1 kg)   10/17/21 152 lb (68.9 kg)   10/05/21 152 lb (68.9 kg)     Body mass index is 25.36 kg/m². CBC:   Lab Results   Component Value Date    WBC 6.1 12/11/2021    RBC 4.13 12/11/2021    RBC 4.17 10/08/2021    HGB 13.2 12/11/2021    HCT 39.8 12/11/2021    MCV 96.4 12/11/2021    RDW 13.0 10/08/2021     12/11/2021       CMP:   Lab Results   Component Value Date     10/17/2021    K 3.4 10/17/2021     10/17/2021    CO2 17 10/17/2021    BUN 11 10/17/2021    CREATININE 0.8 10/17/2021    LABGLOM 75 10/17/2021    GLUCOSE 94 10/17/2021    GLUCOSE 92 10/08/2021    PROT 7.2 10/17/2021    CALCIUM 8.9 10/17/2021    BILITOT 0.5 10/17/2021    ALKPHOS 19 10/17/2021    AST 19 10/17/2021    ALT 15 10/17/2021       POC Tests: No results for input(s): POCGLU, POCNA, POCK, POCCL, POCBUN, POCHEMO, POCHCT in the last 72 hours.     Coags: No results found for: PROTIME, INR, APTT    HCG (If Applicable): No results found for: PREGTESTUR, PREGSERUM, HCG, HCGQUANT     ABGs: No results found for: PHART, PO2ART, IBJ3FMG, ZDG4OAG, BEART, Q3QZJLAM     Type & Screen (If Applicable):  No results found for: LABABO, LABRH    Drug/Infectious Status (If Applicable):  Lab Results   Component Value Date    HEPCAB Non-Reactive 10/08/2021       COVID-19 Screening (If Applicable):   Lab Results   Component Value Date    COVID19 Positive 10/08/2021    COVID19 34.0 10/08/2021           Anesthesia Evaluation  Patient summary reviewed no history of anesthetic complications:   Airway: Mallampati: II  TM distance: >3 FB   Neck ROM: limited  Mouth opening: > = 3 FB Dental:    (+) upper dentures      Pulmonary:normal exam        (-) COPD and asthma                           Cardiovascular:  Exercise tolerance: good (>4 METS),       (-) hypertension, past MI and CAD      Rhythm: regular  Rate: normal                    Neuro/Psych:   (+) headaches: migraine headaches,    (-) seizures and CVA           GI/Hepatic/Renal:        (-) GERD, liver disease and no renal disease       Endo/Other:        (-) diabetes mellitus, hypothyroidism, hyperthyroidism               Abdominal:             Vascular:     - DVT and PE. Other Findings:             Anesthesia Plan      general     ASA 2     (PIV. Additional access can be obtained after induction if needed. Standard ASA monitors. IV/PO opioids and other adjuncts as needed for pain control. PACU post op for recovery. Possible anesthetics complications were discussed with the patient, including but not limited to: PONV, damage to the airway and surrounding structures (teeth, lips, gums, tongue, etc.), adverse reactions to medicine, cardiac complications (MI, CHF, arrhythmias, etc.), respiratory complications (post-op ventilation, respiratory failure, etc.), neurologic complications (nerve damage, stroke, seizure), and death. The patient was given the opportunity to ask questions and all questions were answered to the patient's satisfaction. The patient is in agreement with the anesthetic plan.  )  Induction: intravenous. Anesthetic plan and risks discussed with patient. Plan discussed with CRNA.                   Diana Juares DO   1/11/2022

## 2022-01-11 NOTE — PROGRESS NOTES
1021 Pt arrives to recovery responsive to verbal stimulation. Pt respirations are even and unlabored on 2 L nasal canula. Pt VSS at this time. Pt states her pain is a 10/10. Pt medicated by crna at this time. 1030 Pt states her pain remains a 10/10. Pt medicated with dilaudid at this time. 1035 Pt states her pain is now a 7/10. Pt medicated with fentanyl. Pt respirations are unlabored. VSS. resting with eyes closed. 1040 Pt states her pain remains a 7/10 an medicated with fentanyl. Pt respirations are even and unlabored. VSS. Pt repositioned on Left side. 1050 Pt blood pressure low at this time. Fluids infusing. Pt respirations are unlabored. Pt states her pain is now a 6/0  1105 Pt blood pressure remains low. Pt resting on bed with even and unlabored respirations. VSS. Pt states her pain remains a 6/10  1118 Pt states her pain is now a 7/10. Pt medicated with fentanyl. Pt blood pressure increased with fluids. Respirations are unlabored. VSS  1128 pt states her pain is now a 6/10. Pt resting with eyes closed. VSS. Pt respirations are even and unlabored.    1140 Pt meets criteria for discharge from recovery

## 2022-01-11 NOTE — ANESTHESIA POSTPROCEDURE EVALUATION
Department of Anesthesiology  Postprocedure Note    Patient: Ender Cabello  MRN: [id number]  YOB: 1970  Date of evaluation: 1/11/2022  Time:  3:39 PM     Procedure Summary     Date: 01/11/22 Room / Location: [address]    Anesthesia Start: 5263 Anesthesia Stop: 9926    Procedure: ROBOTIC BSO (N/A Uterus) Diagnosis: (PELVIC PAIN)    Surgeons: Beth Ashraf MD Responsible Provider: Hudson Stokes DO    Anesthesia Type: general ASA Status: 2          Anesthesia Type: general    Ramone Phase I: Ramone Score: 9    Ramone Phase II: Ramone Score: 10    Last vitals: Reviewed and per EMR flowsheets.        Anesthesia Post Evaluation    Patient location during evaluation: PACU  Patient participation: complete - patient participated  Level of consciousness: awake and alert  Airway patency: patent  Nausea & Vomiting: no vomiting and no nausea  Complications: no  Cardiovascular status: hemodynamically stable  Respiratory status: acceptable  Hydration status: stable

## 2022-01-11 NOTE — PROGRESS NOTES
Patient in Same Day Surgery with family present. Patient verified surgical and anesthesia consent. Glasses and dentures left with family member in room. Patient arrived to OR with no hearing aides, clothing, jewelry, dentures or glasses.

## 2022-03-29 ENCOUNTER — HOSPITAL ENCOUNTER (OUTPATIENT)
Dept: WOMENS IMAGING | Age: 52
Discharge: HOME | End: 2022-03-29
Payer: COMMERCIAL

## 2022-03-29 DIAGNOSIS — Z09 FOLLOW-UP EXAM, 3-6 MONTHS SINCE PREVIOUS EXAM: ICD-10-CM

## 2022-03-29 DIAGNOSIS — N63.24 MASS OF LOWER INNER QUADRANT OF LEFT BREAST: ICD-10-CM

## 2022-03-29 PROCEDURE — 77066 DX MAMMO INCL CAD BI: CPT

## 2022-09-07 ENCOUNTER — HOSPITAL ENCOUNTER (OUTPATIENT)
Dept: GENERAL RADIOLOGY | Age: 52
Discharge: HOME | End: 2022-09-07
Payer: COMMERCIAL

## 2022-09-07 ENCOUNTER — HOSPITAL ENCOUNTER (OUTPATIENT)
Age: 52
Discharge: HOME | End: 2022-09-07
Payer: COMMERCIAL

## 2022-09-07 DIAGNOSIS — M43.22 FUSION OF SPINE, CERVICAL REGION: ICD-10-CM

## 2022-09-07 DIAGNOSIS — Z01.818 PREOP EXAMINATION: ICD-10-CM

## 2022-09-07 DIAGNOSIS — M48.02 CERVICAL SPINAL STENOSIS: ICD-10-CM

## 2022-09-07 DIAGNOSIS — Z97.3 WEARS EYEGLASSES: ICD-10-CM

## 2022-09-07 PROCEDURE — 72040 X-RAY EXAM NECK SPINE 2-3 VW: CPT

## 2022-09-13 ENCOUNTER — HOSPITAL ENCOUNTER (OUTPATIENT)
Age: 52
Discharge: HOME | End: 2022-09-13
Payer: COMMERCIAL

## 2022-09-13 ENCOUNTER — HOSPITAL ENCOUNTER (OUTPATIENT)
Dept: GENERAL RADIOLOGY | Age: 52
Discharge: HOME | End: 2022-09-13
Payer: COMMERCIAL

## 2022-09-13 DIAGNOSIS — M25.50 ARTHRALGIA, UNSPECIFIED JOINT: ICD-10-CM

## 2022-09-13 DIAGNOSIS — M25.562 ACUTE PAIN OF BOTH KNEES: ICD-10-CM

## 2022-09-13 DIAGNOSIS — M79.641 RIGHT HAND PAIN: ICD-10-CM

## 2022-09-13 DIAGNOSIS — M25.561 ACUTE PAIN OF BOTH KNEES: ICD-10-CM

## 2022-09-13 PROCEDURE — 73560 X-RAY EXAM OF KNEE 1 OR 2: CPT

## 2022-09-13 PROCEDURE — 73120 X-RAY EXAM OF HAND: CPT

## 2022-09-29 ENCOUNTER — NURSE ONLY (OUTPATIENT)
Dept: LAB | Age: 52
End: 2022-09-29

## 2022-09-29 ENCOUNTER — OFFICE VISIT (OUTPATIENT)
Dept: RHEUMATOLOGY | Age: 52
End: 2022-09-29
Payer: COMMERCIAL

## 2022-09-29 VITALS
SYSTOLIC BLOOD PRESSURE: 112 MMHG | HEIGHT: 65 IN | HEART RATE: 76 BPM | OXYGEN SATURATION: 98 % | BODY MASS INDEX: 27.29 KG/M2 | DIASTOLIC BLOOD PRESSURE: 68 MMHG | WEIGHT: 163.8 LBS

## 2022-09-29 DIAGNOSIS — M79.10 MYALGIA: ICD-10-CM

## 2022-09-29 DIAGNOSIS — G89.29 CHRONIC PAIN OF BOTH KNEES: ICD-10-CM

## 2022-09-29 DIAGNOSIS — M25.561 CHRONIC PAIN OF BOTH KNEES: ICD-10-CM

## 2022-09-29 DIAGNOSIS — R20.2 PARESTHESIA OF ARM: ICD-10-CM

## 2022-09-29 DIAGNOSIS — M79.10 MYALGIA: Primary | ICD-10-CM

## 2022-09-29 DIAGNOSIS — M25.562 CHRONIC PAIN OF BOTH KNEES: ICD-10-CM

## 2022-09-29 LAB
ALBUMIN SERPL-MCNC: 4.7 G/DL (ref 3.5–5.1)
ALP BLD-CCNC: 21 U/L (ref 38–126)
ALT SERPL-CCNC: 13 U/L (ref 11–66)
ANION GAP SERPL CALCULATED.3IONS-SCNC: 10 MEQ/L (ref 8–16)
AST SERPL-CCNC: 15 U/L (ref 5–40)
BILIRUB SERPL-MCNC: 0.2 MG/DL (ref 0.3–1.2)
BUN BLDV-MCNC: 13 MG/DL (ref 7–22)
CALCIUM SERPL-MCNC: 9.7 MG/DL (ref 8.5–10.5)
CHLORIDE BLD-SCNC: 107 MEQ/L (ref 98–111)
CO2: 25 MEQ/L (ref 23–33)
CREAT SERPL-MCNC: 0.9 MG/DL (ref 0.4–1.2)
FOLATE: > 20 NG/ML (ref 4.8–24.2)
GFR SERPL CREATININE-BSD FRML MDRD: 65 ML/MIN/1.73M2
GLUCOSE BLD-MCNC: 99 MG/DL (ref 70–108)
POTASSIUM SERPL-SCNC: 3.5 MEQ/L (ref 3.5–5.2)
SODIUM BLD-SCNC: 142 MEQ/L (ref 135–145)
TOTAL CK: 107 U/L (ref 30–135)
TOTAL PROTEIN: 7.2 G/DL (ref 6.1–8)
TSH SERPL DL<=0.05 MIU/L-ACNC: 1 UIU/ML (ref 0.4–4.2)
VITAMIN B-12: 354 PG/ML (ref 211–911)

## 2022-09-29 PROCEDURE — 99204 OFFICE O/P NEW MOD 45 MIN: CPT | Performed by: INTERNAL MEDICINE

## 2022-09-29 RX ORDER — TRAMADOL HYDROCHLORIDE 50 MG/1
50 TABLET ORAL EVERY 6 HOURS PRN
COMMUNITY

## 2022-09-29 RX ORDER — CELECOXIB 200 MG/1
200 CAPSULE ORAL DAILY
Qty: 30 CAPSULE | Refills: 1 | Status: SHIPPED | OUTPATIENT
Start: 2022-09-29

## 2022-09-29 ASSESSMENT — ENCOUNTER SYMPTOMS
NAUSEA: 0
SHORTNESS OF BREATH: 0
WHEEZING: 0
BLOOD IN STOOL: 0
VOMITING: 0
COUGH: 0
ABDOMINAL PAIN: 0

## 2022-09-29 NOTE — PROGRESS NOTES
[facility]) Physicians   Rheumatology Clinic Note      2022       Reason for Consult:  joint pain  Requesting Physician:  Stefania Tirado, *     CHIEF COMPLAINT:    Chief Complaint   Patient presents with    New Patient     Arthralgia             HISTORY OF PRESENT ILLNESS:    45 y.o. female presents today for evaluation of joint pain. Reports having generlaized pain for past 2-3 months. H/o neck surgery 2019 and has been having problems with neck since then. However, this new diffuse myalgias: muscles, joints, head-to-toe. \"Even my hair hurt\". H/o migraines  Hs poor sleep    Pain is pins and needles, sharp, with episodes of sharp stabbing pain. Past Medical History:     has a past medical history of Arthritis and Migraines. Past Surgical History:     has a past surgical history that includes  section; Hysterectomy; cervical fusion (); TRA US GUIDED BREAST BIOPSY W LOC DEVICE 1ST LESION RIGHT (Right, 10/21/2021); Salpingo-oophorectomy (N/A, 2022); and Colonoscopy (2022). Current Medications:      Current Outpatient Medications:     traMADol (ULTRAM) 50 MG tablet, Take 50 mg by mouth every 6 hours as needed for Pain., Disp: , Rfl:     celecoxib (CELEBREX) 200 MG capsule, Take 1 capsule by mouth daily, Disp: 30 capsule, Rfl: 1    Calcium-Phosphorus-Vitamin D (CITRACAL +D3 PO), , Disp: , Rfl:     Nutritional Supplements (ESTROVEN WEIGHT MANAGEMENT PO), , Disp: , Rfl:     OMEPRAZOLE PO, , Disp: , Rfl:     DULoxetine (CYMBALTA) 20 MG extended release capsule, Take 1 capsule by mouth 2 times daily, Disp: 180 capsule, Rfl: 1    topiramate ER (TROKENDI XR) 100 MG CP24, TAKE 1 CAPSULE BY MOUTH EVERY DAY, Disp: 90 capsule, Rfl: 3    meloxicam (MOBIC) 7.5 MG tablet, Take 1 tablet by mouth daily (Patient not taking: Reported on 2022), Disp: 30 tablet, Rfl: 3    Allergies:    Oxycodone-acetaminophen    Social History:     reports that she has never smoked.  She has never used smokeless tobacco. She reports that she does not currently use alcohol. She reports that she does not use drugs. Family History:   family history includes Breast Cancer (age of onset: 54) in her maternal aunt; Breast Cancer (age of onset: 76) in her paternal aunt; Esophageal Cancer in her father; [employer] in her mother. REVIEW OF SYSTEMS:    Review of Systems   Constitutional:  Positive for fatigue. Negative for chills, fever and unexpected weight change. HENT:  Negative for mouth sores. Respiratory:  Negative for cough, shortness of breath and wheezing. Cardiovascular:  Negative for chest pain and leg swelling. Gastrointestinal:  Negative for abdominal pain, blood in stool, nausea and vomiting. Skin:  Negative for rash. Neurological:  Positive for headaches. PHYSICAL EXAM:    Vitals:    /68 (Site: Left Upper Arm, Position: Sitting, Cuff Size: Medium Adult)   Pulse 76   Ht 5' 5\" (1.651 m)   Wt 163 lb 12.8 oz (74.3 kg)   SpO2 98%   BMI 27.26 kg/m²     Physical Exam  Constitutional:       General: She is not in acute distress. Appearance: Normal appearance. HENT:      Mouth/Throat:      Mouth: Mucous membranes are moist.      Pharynx: Oropharynx is clear. Eyes:      Extraocular Movements: Extraocular movements intact. Conjunctiva/sclera: Conjunctivae normal.   Cardiovascular:      Rate and Rhythm: Normal rate and regular rhythm. Heart sounds: Normal heart sounds. No murmur heard. No friction rub. Pulmonary:      Effort: Pulmonary effort is normal. No respiratory distress. Breath sounds: Normal breath sounds. No wheezing or rhonchi. Abdominal:      Palpations: Abdomen is soft. Musculoskeletal:         General: No swelling, tenderness or deformity. Normal range of motion. Cervical back: Normal range of motion and neck supple. Right lower leg: No edema. Left lower leg: No edema.    Lymphadenopathy:      Cervical: No cervical adenopathy. Skin:     General: Skin is warm and dry. Findings: No lesion or rash. Neurological:      General: No focal deficit present. Mental Status: She is alert and oriented to person, place, and time. Mental status is at baseline. Cranial Nerves: No cranial nerve deficit. Gait: Gait normal.   Psychiatric:         Mood and Affect: Mood normal.          DATA:     Latest Reference Range & Units 8/31/22 11:00   CRP <5 g/dL <3      Latest Reference Range & Units 8/31/22 11:00   WBC 3.5 - 11.0 K/uL 5.0   RBC 3.80 - 5.40 M/uL 4.06   Hemoglobin Quant 11.5 - 15.5 g/dL 12.5   Hematocrit 34.0 - 45.0 % 38.0   MCV 80.0 - 99.0 fL 93.6   MCH 25.0 - 33.0 pg 30.8   MCHC 31.0 - 36.0 g/dL 32.9   MPV 9.3 - 13.0 fL 10.5   RDW 11.5 - 15.0 % 12.6   Platelet Count 554 - 400 K/uL 283      Latest Reference Range & Units 8/31/22 11:00   Sed Rate 0 - 20 mm/hr 6      Latest Reference Range & Units 8/31/22 11:00   FABIANO NEGATIVE  NEGATIVE   FABIANO PATTERN  SEE BELOW   Centromere Pattern NEGATIVE TITER NEGATIVE   Homogeneous Pattern NEGATIVE TITER NEGATIVE   Nucleolar Pattern NEGATIVE TITER NEGATIVE   Rheumatoid Factor <14 IU/mL <10   Speckled Pattern NEGATIVE TITER NEGATIVE           IMPRESSION/RECOMMENDATIONS:      1. Diffuse myalgia: etiology is not clear. -- assess muscle enzymes, thyroid level, B12/folate    2. Paresthesia and weakness in upper extremities. ?cervical radiculopathy Vs carpal tunnel  -- EMG/NCS upper extremities    3. Bilateral knee pain: reviewed with pt xray images of the knees. No significant degenerative arthritis. Has features of enthesopathy at the patella. -- trial of Celebrex 200 mg daily. Discussed with pt the benefits, risks and adverse effects of this medication.  Patient expressed understanding.  -- she did not tolerate meloxicam or naproxen due to GI upset    RTC in 3 weeks        Orders Placed This Encounter   Procedures    CK     Standing Status:   Future     Number of Occurrences:   1 Standing Expiration Date:   3/29/2023    Aldolase     Standing Status:   Future     Number of Occurrences:   1     Standing Expiration Date:   9/29/2023    Comprehensive Metabolic Panel     Standing Status:   Future     Number of Occurrences:   1     Standing Expiration Date:   3/29/2023    TSH     Standing Status:   Future     Number of Occurrences:   1     Standing Expiration Date:   3/29/2023    Vitamin B12 & Folate     Standing Status:   Future     Number of Occurrences:   1     Standing Expiration Date:   9/29/2023    [facility]. [facility] Neurology (EMG) - Toyin Cagle MD     Referral Priority:   Routine     Referral Type:   Eval and Treat     Referral Reason:   Specialty Services Required     Referred to Provider:   Kel Garcia MD     Requested Specialty:   Neurology     Number of Visits Requested:   [address] Noemi Esteban MD    [address] [facility]. [address] [place] [address]. [address] [phone]

## 2022-10-05 LAB — ALDOLASE: 2.8 U/L (ref 1.2–7.6)

## 2022-10-11 ENCOUNTER — HOSPITAL ENCOUNTER (OUTPATIENT)
Dept: MRI IMAGING | Age: 52
Discharge: HOME | End: 2022-10-11
Payer: COMMERCIAL

## 2022-10-11 DIAGNOSIS — M43.22 CERVICAL VERTEBRAL FUSION: ICD-10-CM

## 2022-10-11 PROCEDURE — 72148 MRI LUMBAR SPINE W/O DYE: CPT

## 2022-10-14 ENCOUNTER — PROCEDURE VISIT (OUTPATIENT)
Dept: NEUROLOGY | Age: 52
End: 2022-10-14
Payer: COMMERCIAL

## 2022-10-14 ENCOUNTER — HOSPITAL ENCOUNTER (OUTPATIENT)
Dept: MRI IMAGING | Age: 52
Discharge: HOME | End: 2022-10-14
Payer: COMMERCIAL

## 2022-10-14 DIAGNOSIS — M48.02 CERVICAL SPINAL STENOSIS: ICD-10-CM

## 2022-10-14 DIAGNOSIS — M54.2 NECK PAIN: ICD-10-CM

## 2022-10-14 DIAGNOSIS — G56.01 RIGHT CARPAL TUNNEL SYNDROME: ICD-10-CM

## 2022-10-14 DIAGNOSIS — M54.12 CERVICAL RADICULOPATHY: ICD-10-CM

## 2022-10-14 DIAGNOSIS — M79.601 BILATERAL ARM PAIN: Primary | ICD-10-CM

## 2022-10-14 DIAGNOSIS — M79.602 BILATERAL ARM PAIN: Primary | ICD-10-CM

## 2022-10-14 PROCEDURE — 95886 MUSC TEST DONE W/N TEST COMP: CPT | Performed by: PSYCHIATRY & NEUROLOGY

## 2022-10-14 PROCEDURE — 72141 MRI NECK SPINE W/O DYE: CPT

## 2022-10-14 PROCEDURE — 95911 NRV CNDJ TEST 9-10 STUDIES: CPT | Performed by: PSYCHIATRY & NEUROLOGY

## 2022-10-20 ENCOUNTER — OFFICE VISIT (OUTPATIENT)
Dept: RHEUMATOLOGY | Age: 52
End: 2022-10-20
Payer: COMMERCIAL

## 2022-10-20 VITALS
HEART RATE: 72 BPM | HEIGHT: 65 IN | DIASTOLIC BLOOD PRESSURE: 70 MMHG | SYSTOLIC BLOOD PRESSURE: 112 MMHG | BODY MASS INDEX: 27.32 KG/M2 | WEIGHT: 164 LBS | OXYGEN SATURATION: 98 %

## 2022-10-20 DIAGNOSIS — M47.812 DEGENERATIVE JOINT DISEASE OF CERVICAL AND LUMBAR SPINE: ICD-10-CM

## 2022-10-20 DIAGNOSIS — M54.12 CHRONIC CERVICAL RADICULOPATHY: Primary | ICD-10-CM

## 2022-10-20 DIAGNOSIS — M47.816 DEGENERATIVE JOINT DISEASE OF CERVICAL AND LUMBAR SPINE: ICD-10-CM

## 2022-10-20 DIAGNOSIS — E53.8 LOW SERUM VITAMIN B12: ICD-10-CM

## 2022-10-20 PROCEDURE — 99214 OFFICE O/P EST MOD 30 MIN: CPT | Performed by: INTERNAL MEDICINE

## 2022-10-20 RX ORDER — GABAPENTIN 100 MG/1
100 CAPSULE ORAL 2 TIMES DAILY
Qty: 60 CAPSULE | Refills: 1 | Status: SHIPPED | OUTPATIENT
Start: 2022-10-20 | End: 2022-11-19

## 2022-10-20 ASSESSMENT — ENCOUNTER SYMPTOMS
SHORTNESS OF BREATH: 0
COUGH: 0
NAUSEA: 0
ABDOMINAL PAIN: 0
BLOOD IN STOOL: 0
VOMITING: 0
WHEEZING: 0

## 2022-10-20 NOTE — PROGRESS NOTES
[facility]) Physicians   Rheumatology Clinic Note      10/20/2022       CHIEF COMPLAINT:    Chief Complaint   Patient presents with    Follow-up     3 week f/u            HISTORY OF PRESENT ILLNESS:    45 y.o. female presents today for follow-up. In last office visit, labs and EMG/NCS were ordered. Celebrex was prescribed. Has not noticed much improvement in her pain after starting celebrex. Continues to have pain. Sharp shooting pain that radiates from neck to her right arm. Has numbness and tingling in her finger tips, most in the right hand, but now started to involve her left hand. Saw her neurosurgeon recently. Had MRI of cervical and lumbar spine. Was informed that there are no complications from surgery and MRI looked good. RECAP:  H/o neck surgery 2019 and has been having problems with neck since then. H/o migraines  Hs poor sleep      Past Medical History:     has a past medical history of Arthritis and Migraines. Past Surgical History:     has a past surgical history that includes  section; Hysterectomy; cervical fusion (2019); [facility] US GUIDED BREAST BIOPSY W LOC DEVICE 1ST LESION RIGHT (Right, 10/21/2021); Salpingo-oophorectomy (N/A, 2022); and Colonoscopy (2022). Current Medications:      Current Outpatient Medications:     gabapentin (NEURONTIN) 100 MG capsule, Take 1 capsule by mouth 2 times daily for 30 days.  Intended supply: 30 days, Disp: 60 capsule, Rfl: 1    traMADol (ULTRAM) 50 MG tablet, Take 50 mg by mouth every 6 hours as needed for Pain., Disp: , Rfl:     celecoxib (CELEBREX) 200 MG capsule, Take 1 capsule by mouth daily, Disp: 30 capsule, Rfl: 1    meloxicam (MOBIC) 7.5 MG tablet, Take 1 tablet by mouth daily, Disp: 30 tablet, Rfl: 3    Calcium-Phosphorus-Vitamin D (CITRACAL +D3 PO), , Disp: , Rfl:     Nutritional Supplements (ESTROVEN WEIGHT MANAGEMENT PO), , Disp: , Rfl:     OMEPRAZOLE PO, , Disp: , Rfl:     DULoxetine (CYMBALTA) 20 MG extended release capsule, Take 1 capsule by mouth 2 times daily, Disp: 180 capsule, Rfl: 1    topiramate ER (TROKENDI XR) 100 MG CP24, TAKE 1 CAPSULE BY MOUTH EVERY DAY, Disp: 90 capsule, Rfl: 3    Allergies:    Oxycodone-acetaminophen    Social History:     reports that she has never smoked. She has never used smokeless tobacco. She reports that she does not currently use alcohol. She reports that she does not use drugs. Family History:   family history includes Breast Cancer (age of onset: 54) in her maternal aunt; Breast Cancer (age of onset: 76) in her paternal aunt; Esophageal Cancer in her father; [employer] in her mother. REVIEW OF SYSTEMS:    Review of Systems   Constitutional:  Positive for fatigue. Negative for chills, fever and unexpected weight change. HENT:  Negative for mouth sores. Respiratory:  Negative for cough, shortness of breath and wheezing. Cardiovascular:  Negative for chest pain and leg swelling. Gastrointestinal:  Negative for abdominal pain, blood in stool, nausea and vomiting. Skin:  Negative for rash. Neurological:  Positive for headaches. PHYSICAL EXAM:    Vitals:    /70 (Site: Left Upper Arm, Position: Sitting, Cuff Size: Medium Adult)   Pulse 72   Ht 5' 5\" (1.651 m)   Wt 164 lb (74.4 kg)   SpO2 98%   BMI 27.29 kg/m²     Physical Exam  Constitutional:       General: She is not in acute distress. Appearance: Normal appearance. Eyes:      Conjunctiva/sclera: Conjunctivae normal.   Pulmonary:      Effort: Pulmonary effort is normal.   Musculoskeletal:         General: No deformity. Cervical back: Neck supple. Skin:     General: Skin is warm and dry. Findings: No rash. Neurological:      General: No focal deficit present. Mental Status: She is alert and oriented to person, place, and time.       Gait: Gait normal.   Psychiatric:         Mood and Affect: Mood normal.          DATA:      EMG/NCS upper extremities:         Latest Reference Range & Units 9/29/22 16:10   FOLATE, FOLAT 4.8 - 24.2 ng/mL > 20.0   Vitamin B-12 211 - 911 pg/mL 354      Latest Reference Range & Units 9/29/22 16:10   Total CK 30 - 135 U/L 107      Latest Reference Range & Units 8/31/22 11:00   CRP <5 g/dL <3      Latest Reference Range & Units 8/31/22 11:00   WBC 3.5 - 11.0 K/uL 5.0   RBC 3.80 - 5.40 M/uL 4.06   Hemoglobin Quant 11.5 - 15.5 g/dL 12.5   Hematocrit 34.0 - 45.0 % 38.0   MCV 80.0 - 99.0 fL 93.6   MCH 25.0 - 33.0 pg 30.8   MCHC 31.0 - 36.0 g/dL 32.9   MPV 9.3 - 13.0 fL 10.5   RDW 11.5 - 15.0 % 12.6   Platelet Count 184 - 400 K/uL 283      Latest Reference Range & Units 8/31/22 11:00   Sed Rate 0 - 20 mm/hr 6      Latest Reference Range & Units 8/31/22 11:00   FABIANO NEGATIVE  NEGATIVE   FABIANO PATTERN  SEE BELOW   Centromere Pattern NEGATIVE TITER NEGATIVE   Homogeneous Pattern NEGATIVE TITER NEGATIVE   Nucleolar Pattern NEGATIVE TITER NEGATIVE   Rheumatoid Factor <14 IU/mL <10   Speckled Pattern NEGATIVE TITER NEGATIVE           IMPRESSION/RECOMMENDATIONS:      1. Neuropathic pain in upper extremities. Likely related to chronic cervical radiculopathy. -- trial of gabapentin 100 mg in evening x 1 week,t hen 100 mg bid. Discussed with pt the benefits, risks and adverse effects of this medication. Patient expressed understanding. 2. Low-normal vitamin B12 level: this may contribute to generalized fatigue   -- advised pt to take OTC Vit B12 1,000 mcg daily    RTC in 6 weeks        No orders of the defined types were placed in this encounter.        Rayne Coughlin MD    [address]   [address] [facility]. [address]  [phone]

## 2022-11-01 PROBLEM — F41.9 ANXIETY: Status: ACTIVE | Noted: 2022-11-01

## 2022-11-01 PROBLEM — G43.909 MIGRAINE WITHOUT STATUS MIGRAINOSUS, NOT INTRACTABLE: Status: ACTIVE | Noted: 2022-11-01

## 2022-11-01 PROBLEM — M50.90 CERVICAL DISC DISEASE: Status: ACTIVE | Noted: 2022-11-01

## 2022-12-19 DIAGNOSIS — G89.29 CHRONIC PAIN OF BOTH KNEES: ICD-10-CM

## 2022-12-19 DIAGNOSIS — R20.2 PARESTHESIA OF ARM: ICD-10-CM

## 2022-12-19 DIAGNOSIS — M25.561 CHRONIC PAIN OF BOTH KNEES: ICD-10-CM

## 2022-12-19 DIAGNOSIS — M79.10 MYALGIA: ICD-10-CM

## 2022-12-19 DIAGNOSIS — M25.562 CHRONIC PAIN OF BOTH KNEES: ICD-10-CM

## 2022-12-19 DIAGNOSIS — M54.12 CHRONIC CERVICAL RADICULOPATHY: ICD-10-CM

## 2022-12-19 RX ORDER — CELECOXIB 200 MG/1
200 CAPSULE ORAL DAILY
Qty: 30 CAPSULE | Refills: 3 | OUTPATIENT
Start: 2022-12-19

## 2022-12-19 RX ORDER — GABAPENTIN 100 MG/1
200 CAPSULE ORAL 2 TIMES DAILY
Qty: 120 CAPSULE | Refills: 1 | OUTPATIENT
Start: 2022-12-19 | End: 2023-01-18

## 2023-01-04 ENCOUNTER — OFFICE VISIT (OUTPATIENT)
Dept: RHEUMATOLOGY | Age: 53
End: 2023-01-04
Payer: COMMERCIAL

## 2023-01-04 VITALS
WEIGHT: 169.8 LBS | OXYGEN SATURATION: 97 % | SYSTOLIC BLOOD PRESSURE: 116 MMHG | HEART RATE: 73 BPM | HEIGHT: 65 IN | BODY MASS INDEX: 28.29 KG/M2 | DIASTOLIC BLOOD PRESSURE: 74 MMHG

## 2023-01-04 DIAGNOSIS — M65.311 TRIGGER FINGER OF RIGHT THUMB: Primary | ICD-10-CM

## 2023-01-04 DIAGNOSIS — M54.12 CHRONIC CERVICAL RADICULOPATHY: ICD-10-CM

## 2023-01-04 PROCEDURE — 99214 OFFICE O/P EST MOD 30 MIN: CPT | Performed by: INTERNAL MEDICINE

## 2023-01-04 PROCEDURE — 20550 NJX 1 TENDON SHEATH/LIGAMENT: CPT | Performed by: INTERNAL MEDICINE

## 2023-01-04 RX ORDER — METHYLPREDNISOLONE ACETATE 40 MG/ML
20 INJECTION, SUSPENSION INTRA-ARTICULAR; INTRALESIONAL; INTRAMUSCULAR; SOFT TISSUE ONCE
Status: COMPLETED | OUTPATIENT
Start: 2023-01-04 | End: 2023-01-04

## 2023-01-04 RX ORDER — GABAPENTIN 100 MG/1
200 CAPSULE ORAL 2 TIMES DAILY
Qty: 120 CAPSULE | Refills: 3 | Status: SHIPPED | OUTPATIENT
Start: 2023-01-04 | End: 2023-02-03

## 2023-01-04 RX ADMIN — METHYLPREDNISOLONE ACETATE 20 MG: 40 INJECTION, SUSPENSION INTRA-ARTICULAR; INTRALESIONAL; INTRAMUSCULAR; SOFT TISSUE at 13:44

## 2023-01-04 ASSESSMENT — ENCOUNTER SYMPTOMS
COUGH: 0
SHORTNESS OF BREATH: 0
VOMITING: 0
ABDOMINAL PAIN: 0
NAUSEA: 0

## 2023-01-04 NOTE — PROGRESS NOTES
[facility]   Rheumatology Clinic Note      2023       CHIEF COMPLAINT:    Chief Complaint   Patient presents with    Follow-up     6 wk f/u cervical radiculopathy           HISTORY OF PRESENT ILLNESS:    45 y.o. female with osteoarthritis, degenerative cervical spine, cervical radiculpathy presents today for follow-up. She is on gabapentin 200 mg bid (started last visit) and Celebrex 200 mg daily. Reports that the gabapentin is helping her neck pain and radicular pain. Continues to have numbness in her fingertips, but is tolerable at this time. Main concern is persistent pain in the right thumb over past several weeks. Having episodes of difficulty bending her thumb due to pain. This is interfering with her job. Sharp nonradiating in nature. No significant pain in other joints. RECAP:  H/o neck surgery 2019 and has been having problems with neck since then. H/o migraines  Hs poor sleep      Past Medical History:     has a past medical history of Arthritis and Migraines. Past Surgical History:     has a past surgical history that includes  section; Hysterectomy; cervical fusion (); [facility] US GUIDED BREAST BIOPSY W LOC DEVICE 1ST LESION RIGHT (Right, 10/21/2021); Salpingo-oophorectomy (N/A, 2022); and Colonoscopy (2022). Current Medications:      Current Outpatient Medications:     gabapentin (NEURONTIN) 100 MG capsule, Take 2 capsules by mouth 2 times daily for 30 days.  Intended supply: 30 days, Disp: 120 capsule, Rfl: 3    celecoxib (CELEBREX) 200 MG capsule, Take 1 capsule by mouth daily, Disp: 30 capsule, Rfl: 3    vitamin B-12 (CYANOCOBALAMIN) 1000 MCG tablet, Take 1,000 mcg by mouth daily, Disp: , Rfl:     topiramate ER (TROKENDI XR) 100 MG CP24, TAKE 1 CAPSULE BY MOUTH EVERY DAY, Disp: 90 capsule, Rfl: 3    Calcium-Phosphorus-Vitamin D (CITRACAL +D3 PO), , Disp: , Rfl:     DULoxetine (CYMBALTA) 30 MG extended release capsule, Take 1 capsule by mouth 2 times daily, Disp: 60 capsule, Rfl: 11    traMADol (ULTRAM) 50 MG tablet, Take 50 mg by mouth every 6 hours as needed for Pain. (Patient not taking: Reported on 1/4/2023), Disp: , Rfl:     OMEPRAZOLE PO, , Disp: , Rfl:     Allergies:    Oxycodone-acetaminophen    Social History:     reports that she has never smoked. She has never used smokeless tobacco. She reports that she does not currently use alcohol. She reports that she does not use drugs. Family History:   family history includes Breast Cancer (age of onset: 54) in her maternal aunt; Breast Cancer (age of onset: 76) in her paternal aunt; Esophageal Cancer in her father; Yeny Garber in her mother. REVIEW OF SYSTEMS:    Review of Systems   Constitutional:  Negative for chills and fever. Respiratory:  Negative for cough and shortness of breath. Cardiovascular:  Negative for chest pain. Gastrointestinal:  Negative for abdominal pain, nausea and vomiting. Skin:  Negative for rash. PHYSICAL EXAM:    Vitals:    /74 (Site: Left Upper Arm, Position: Sitting, Cuff Size: Medium Adult)   Pulse 73   Ht 5' 5\" (1.651 m)   Wt 169 lb 12.8 oz (77 kg)   SpO2 97%   BMI 28.26 kg/m²     Physical Exam  Constitutional:       General: She is not in acute distress. Appearance: Normal appearance. Eyes:      Conjunctiva/sclera: Conjunctivae normal.   Pulmonary:      Effort: Pulmonary effort is normal.   Musculoskeletal:         General: Tenderness present. No swelling or deformity. Cervical back: Neck supple. Comments: Tenderness along the right first digit flexor tendon, with point of maximum tenderness at the base of the thumb. Skin:     General: Skin is warm and dry. Findings: No rash. Neurological:      General: No focal deficit present. Mental Status: She is alert and oriented to person, place, and time.       Gait: Gait normal.   Psychiatric:         Mood and Affect: Mood normal.          DATA:      EMG/NCS upper extremities:          Procedure note:  Consent for right thumb trigger finger steroid injection was obtained after discussing with pt the procedure, risks and alternatives. Under aseptic technique, and using vapocoolant spray for anaesthesia, Depomedrol 20 mg and bupivacaine 0.5 ml were injected to the right first tendon flexion sheath at the base of the thumb on palmar aspect. Once needle was retracted, Bandage was applied. Pt tolerated the procedure well with no immediate complications were noted. Postprocedure instructions were provided to her. IMPRESSION/RECOMMENDATIONS:      1. Neuropathic pain in upper extremities. Likely related to chronic cervical radiculopathy. Improving with gabapentin  -- continue gabapentin 200 mg bid    2. Trigger finger right thumb: tendon sheath steroid injection was given    3. Osteoarthritis multiple sites. Pain is tolerable overall  -- continue Celebrex 200 mg daily.     RTC in 3 months        Orders Placed This Encounter   Procedures    DC INJECT TENDON SHEATH/LIGAMENT    DC OFFICE OUTPATIENT VISIT 25 MINUTES [[id number]]          Rebecca Moya MD    [address] [facility] [address] [place] [address] [address] [facility]  [address] [place] [address]

## 2023-03-31 ENCOUNTER — HOSPITAL ENCOUNTER (OUTPATIENT)
Dept: WOMENS IMAGING | Age: 53
Discharge: HOME | End: 2023-03-31
Payer: COMMERCIAL

## 2023-03-31 DIAGNOSIS — Z12.31 VISIT FOR SCREENING MAMMOGRAM: ICD-10-CM

## 2023-03-31 PROCEDURE — 77063 BREAST TOMOSYNTHESIS BI: CPT

## 2023-04-04 ENCOUNTER — OFFICE VISIT (OUTPATIENT)
Dept: RHEUMATOLOGY | Age: 53
End: 2023-04-04
Payer: COMMERCIAL

## 2023-04-04 ENCOUNTER — NURSE ONLY (OUTPATIENT)
Dept: LAB | Age: 53
End: 2023-04-04

## 2023-04-04 VITALS
DIASTOLIC BLOOD PRESSURE: 66 MMHG | HEIGHT: 65 IN | BODY MASS INDEX: 29.92 KG/M2 | HEART RATE: 84 BPM | WEIGHT: 179.6 LBS | SYSTOLIC BLOOD PRESSURE: 110 MMHG | OXYGEN SATURATION: 97 %

## 2023-04-04 DIAGNOSIS — Z79.1 NSAID LONG-TERM USE: ICD-10-CM

## 2023-04-04 DIAGNOSIS — M47.816 DEGENERATIVE JOINT DISEASE OF CERVICAL AND LUMBAR SPINE: Primary | ICD-10-CM

## 2023-04-04 DIAGNOSIS — M54.12 CHRONIC CERVICAL RADICULOPATHY: ICD-10-CM

## 2023-04-04 DIAGNOSIS — M47.812 DEGENERATIVE JOINT DISEASE OF CERVICAL AND LUMBAR SPINE: Primary | ICD-10-CM

## 2023-04-04 LAB
ANION GAP SERPL CALC-SCNC: 10 MEQ/L (ref 8–16)
BUN SERPL-MCNC: 14 MG/DL (ref 7–22)
CALCIUM SERPL-MCNC: 9.3 MG/DL (ref 8.5–10.5)
CHLORIDE SERPL-SCNC: 108 MEQ/L (ref 98–111)
CO2 SERPL-SCNC: 24 MEQ/L (ref 23–33)
CREAT SERPL-MCNC: 0.9 MG/DL (ref 0.4–1.2)
GFR SERPL CREATININE-BSD FRML MDRD: > 60 ML/MIN/1.73M2
GLUCOSE SERPL-MCNC: 97 MG/DL (ref 70–108)
POTASSIUM SERPL-SCNC: 3.7 MEQ/L (ref 3.5–5.2)
SODIUM SERPL-SCNC: 142 MEQ/L (ref 135–145)

## 2023-04-04 PROCEDURE — 99214 OFFICE O/P EST MOD 30 MIN: CPT | Performed by: INTERNAL MEDICINE

## 2023-04-04 RX ORDER — CELECOXIB 200 MG/1
200 CAPSULE ORAL DAILY
Qty: 30 CAPSULE | Refills: 3 | Status: SHIPPED | OUTPATIENT
Start: 2023-04-04

## 2023-04-04 RX ORDER — PREGABALIN 50 MG/1
50 CAPSULE ORAL 2 TIMES DAILY
Qty: 60 CAPSULE | Refills: 2 | Status: SHIPPED | OUTPATIENT
Start: 2023-04-04 | End: 2023-05-04

## 2023-04-04 ASSESSMENT — ENCOUNTER SYMPTOMS
ABDOMINAL PAIN: 0
SHORTNESS OF BREATH: 0
COUGH: 0
VOMITING: 0
NAUSEA: 0

## 2023-04-04 NOTE — PROGRESS NOTES
[address]  Physicians   Rheumatology Clinic Note      2023       CHIEF COMPLAINT:    Chief Complaint   Patient presents with    Follow-up     3 month follow up           HISTORY OF PRESENT ILLNESS:    45 y.o. female with osteoarthritis, degenerative cervical spine, cervical radiculpathy presents today for follow-up. She is on gabapentin 200 mg bid and Celebrex 200 mg daily. Her main concern is weight gain since starting gabapentin. She has been cutting down carbs and watching what she is eating. However, over past 3 months, she gained 10 pounds. Pain is mostly in neck, arms, hands, knees and feet. The latter 2 started recently, worsens with standing and walking. Trigger finger injection that was given last visit has helped the pain and sticking of her thumb. RECAP:  H/o neck surgery 2019 and has been having problems with neck since then. H/o migraines  Hs poor sleep      Past Medical History:     has a past medical history of Arthritis and Migraines. Past Surgical History:     has a past surgical history that includes  section; Hysterectomy; cervical fusion (2019); [facility] US GUIDED BREAST BIOPSY W LOC DEVICE 1ST LESION RIGHT (Right, 10/21/2021); Salpingo-oophorectomy (N/A, 2022); and Colonoscopy (2022). Current Medications:      Current Outpatient Medications:     celecoxib (CELEBREX) 200 MG capsule, Take 1 capsule by mouth daily, Disp: 30 capsule, Rfl: 3    pregabalin (LYRICA) 50 MG capsule, Take 1 capsule by mouth 2 times daily for 30 days.  Max Daily Amount: 100 mg, Disp: 60 capsule, Rfl: 2    vitamin B-12 (CYANOCOBALAMIN) 1000 MCG tablet, Take 1 tablet by mouth daily, Disp: , Rfl:     topiramate ER (TROKENDI XR) 100 MG CP24, TAKE 1 CAPSULE BY MOUTH EVERY DAY, Disp: 90 capsule, Rfl: 3    Calcium-Phosphorus-Vitamin D (CITRACAL +D3 PO), , Disp: , Rfl:     DULoxetine (CYMBALTA) 30 MG extended release capsule, Take 1 capsule by mouth 2 times daily, Disp: 60 capsule, Rfl:

## 2023-04-04 NOTE — LETTER
April 4, 2023       Isidra Benz YOB: 1970   [id number] [phone] Date of Visit:  4/4/2023       To Whom It May Concern: It is my medical opinion that Jayson Moore should avoid lifting objects over 30 pounds and avoid over-head lifting of any object above 5 pounds due to her underlying health condition. If you have any questions or concerns, please don't hesitate to call.         Sincerely,        Deanna Nelson MD

## 2023-04-17 ENCOUNTER — PATIENT MESSAGE (OUTPATIENT)
Dept: RHEUMATOLOGY | Age: 53
End: 2023-04-17

## 2023-04-18 NOTE — TELEPHONE ENCOUNTER
From: Tashi Been  To: Dr. 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 Non-stop lifting of things, repetitive, my thumb now has a feeling of like having a splinter in it, hard to explain. And has done the \"clicking\" it's just a new feeling. As far as the lyrica, how long does it take for it to start working? ? I don't know what I am asking I just need to figure things out. I'm tired. The pain management thing worries me because people have told me that they control your other meds too, and I don't want those taken away. Plus I don't want to be on narcotics! Not sure what a good solution is!!  I'm sorry!

## 2023-06-07 ENCOUNTER — PATIENT MESSAGE (OUTPATIENT)
Dept: RHEUMATOLOGY | Age: 53
End: 2023-06-07

## 2023-06-07 DIAGNOSIS — M54.12 CHRONIC CERVICAL RADICULOPATHY: ICD-10-CM

## 2023-06-07 NOTE — TELEPHONE ENCOUNTER
From: Deja Liao  To: Dr. Stoll Esters: 6/7/2023 2:08 PM EDT  Subject: Media Hoar    Dr yV Mei,  I have a slight issue! I am in [place] and am on my last week here, I thought I brought enough meds for my stay. Marysol Jang I was wrong! I ran out of my Lyrica a couple days ago, I also ran out of my antidepressant which I took care of also, I'm feeling pretty awful from that and thinking part is from stopping Lyrica also. Is there any way you could call in a prescription out here to a [facility] in [facility] [phone]. I fly out on Saturday and I want to be able to make my flight.    My number is [phone] [facility] [birth year]

## 2023-06-08 RX ORDER — PREGABALIN 50 MG/1
50 CAPSULE ORAL 2 TIMES DAILY
Qty: 10 CAPSULE | Refills: 0 | Status: SHIPPED | OUTPATIENT
Start: 2023-06-08 | End: 2023-06-13

## 2023-07-14 ENCOUNTER — PATIENT MESSAGE (OUTPATIENT)
Dept: RHEUMATOLOGY | Age: 53
End: 2023-07-14

## 2023-07-18 ENCOUNTER — HOSPITAL ENCOUNTER (OUTPATIENT)
Dept: NON INVASIVE DIAGNOSTICS | Age: 53
Discharge: HOME | End: 2023-07-18
Payer: COMMERCIAL

## 2023-07-18 DIAGNOSIS — R60.0 LOWER LEG EDEMA: ICD-10-CM

## 2023-07-18 LAB
LV EF: 60 %
LVEF MODALITY: NORMAL

## 2023-07-18 PROCEDURE — 93306 TTE W/DOPPLER COMPLETE: CPT

## 2023-08-08 ENCOUNTER — OFFICE VISIT (OUTPATIENT)
Dept: RHEUMATOLOGY | Age: 53
End: 2023-08-08
Payer: COMMERCIAL

## 2023-08-08 VITALS
DIASTOLIC BLOOD PRESSURE: 76 MMHG | BODY MASS INDEX: 29.82 KG/M2 | HEART RATE: 86 BPM | OXYGEN SATURATION: 98 % | HEIGHT: 65 IN | WEIGHT: 179 LBS | SYSTOLIC BLOOD PRESSURE: 116 MMHG

## 2023-08-08 DIAGNOSIS — M54.12 CHRONIC CERVICAL RADICULOPATHY: ICD-10-CM

## 2023-08-08 DIAGNOSIS — G57.93 NEUROPATHIC PAIN OF BOTH FEET: Primary | ICD-10-CM

## 2023-08-08 PROCEDURE — 99214 OFFICE O/P EST MOD 30 MIN: CPT | Performed by: INTERNAL MEDICINE

## 2023-08-08 RX ORDER — PREGABALIN 50 MG/1
50 CAPSULE ORAL 3 TIMES DAILY
Qty: 60 CAPSULE | Refills: 3 | Status: SHIPPED | OUTPATIENT
Start: 2023-08-08 | End: 2023-11-06

## 2023-08-08 ASSESSMENT — ENCOUNTER SYMPTOMS
NAUSEA: 0
VOMITING: 0
COUGH: 0
SHORTNESS OF BREATH: 0
ABDOMINAL PAIN: 0

## 2023-08-08 NOTE — PATIENT INSTRUCTIONS
Increase Lyrica to 50 mg in the morning and 100 mg in the evening.   Suggest trial of over-the-counter magnesium supplement (around 200-400 mg daily)

## 2023-08-08 NOTE — PROGRESS NOTES
[facility]) Physicians   Rheumatology Clinic Note      2023       CHIEF COMPLAINT:    Chief Complaint   Patient presents with    Follow-up     Trigger finger of right thumb    Other     Pt is following up due to swelling in lower extremities. Pain score 7           HISTORY OF PRESENT ILLNESS:    45 y.o. female with osteoarthritis, degenerative cervical spine, cervical radiculpathy presents today for urgent evaluation of pain in the feet. She is on Lyrica 50 mg bid and Celebrex 200 mg daily. Past med: gabapentin (weight gain)    Reports worsening pain in her legs and feet. Has been having swelling in the legs. Not much swelling today, as she has not stood for work yet. Pain in feet interferes with her sleep. Describes it as constant burning pain in her toes. No skin changes. No joint swelling. H/o neck surgery 2019 and has been having problems with neck since then. H/o migraines      Past Medical History:     has a past medical history of Arthritis and Migraines. Past Surgical History:     has a past surgical history that includes  section; Hysterectomy; cervical fusion (2019); [facility] US GUIDED BREAST BIOPSY W LOC DEVICE 1ST LESION RIGHT (Right, 10/21/2021); Salpingo-oophorectomy (N/A, 2022); and Colonoscopy (2022). Current Medications:      Current Outpatient Medications:     pregabalin (LYRICA) 50 MG capsule, Take 1 capsule by mouth 3 times daily for 90 days.  Max Daily Amount: 150 mg, Disp: 60 capsule, Rfl: 3    furosemide (LASIX) 20 MG tablet, Take 1 tablet by mouth daily as needed (lower leg edema), Disp: 60 tablet, Rfl: 3    potassium chloride (KLOR-CON M) 10 MEQ extended release tablet, Take 1 tablet by mouth daily Take with lasix, Disp: 30 tablet, Rfl: 5    celecoxib (CELEBREX) 200 MG capsule, Take 1 capsule by mouth daily, Disp: 30 capsule, Rfl: 5    vitamin B-12 (CYANOCOBALAMIN) 1000 MCG tablet, Take 1 tablet by mouth daily, Disp: , Rfl:     topiramate ER (TROKENDI XR)

## 2023-08-21 ENCOUNTER — HOSPITAL ENCOUNTER (OUTPATIENT)
Dept: GENERAL RADIOLOGY | Age: 53
Discharge: HOME | End: 2023-08-21
Payer: COMMERCIAL

## 2023-08-21 ENCOUNTER — HOSPITAL ENCOUNTER (OUTPATIENT)
Age: 53
Discharge: HOME | End: 2023-08-21
Payer: COMMERCIAL

## 2023-08-21 DIAGNOSIS — M54.9 MID BACK PAIN: ICD-10-CM

## 2023-08-21 DIAGNOSIS — M54.41 ACUTE MIDLINE LOW BACK PAIN WITH RIGHT-SIDED SCIATICA: ICD-10-CM

## 2023-08-21 PROCEDURE — 72100 X-RAY EXAM L-S SPINE 2/3 VWS: CPT

## 2023-08-21 PROCEDURE — 72072 X-RAY EXAM THORAC SPINE 3VWS: CPT

## 2023-09-05 ENCOUNTER — PATIENT MESSAGE (OUTPATIENT)
Dept: RHEUMATOLOGY | Age: 53
End: 2023-09-05

## 2023-09-05 DIAGNOSIS — M51.36 DDD (DEGENERATIVE DISC DISEASE), LUMBAR: ICD-10-CM

## 2023-09-05 DIAGNOSIS — M47.812 DEGENERATIVE JOINT DISEASE OF CERVICAL AND LUMBAR SPINE: Primary | ICD-10-CM

## 2023-09-05 DIAGNOSIS — M47.816 DEGENERATIVE JOINT DISEASE OF CERVICAL AND LUMBAR SPINE: Primary | ICD-10-CM

## 2023-09-12 ENCOUNTER — OFFICE VISIT (OUTPATIENT)
Dept: RHEUMATOLOGY | Age: 53
End: 2023-09-12
Payer: COMMERCIAL

## 2023-09-12 VITALS
HEART RATE: 76 BPM | SYSTOLIC BLOOD PRESSURE: 116 MMHG | DIASTOLIC BLOOD PRESSURE: 78 MMHG | BODY MASS INDEX: 31.49 KG/M2 | OXYGEN SATURATION: 97 % | WEIGHT: 189 LBS | HEIGHT: 65 IN

## 2023-09-12 DIAGNOSIS — M51.36 DDD (DEGENERATIVE DISC DISEASE), LUMBAR: ICD-10-CM

## 2023-09-12 DIAGNOSIS — M47.812 DEGENERATIVE JOINT DISEASE OF CERVICAL AND LUMBAR SPINE: Primary | ICD-10-CM

## 2023-09-12 DIAGNOSIS — Z79.1 NSAID LONG-TERM USE: ICD-10-CM

## 2023-09-12 DIAGNOSIS — M47.816 DEGENERATIVE JOINT DISEASE OF CERVICAL AND LUMBAR SPINE: Primary | ICD-10-CM

## 2023-09-12 PROCEDURE — 99214 OFFICE O/P EST MOD 30 MIN: CPT | Performed by: INTERNAL MEDICINE

## 2023-09-12 RX ORDER — ETODOLAC 400 MG/1
400 TABLET, FILM COATED ORAL 2 TIMES DAILY
Qty: 60 TABLET | Refills: 2 | Status: SHIPPED | OUTPATIENT
Start: 2023-09-12 | End: 2023-12-11

## 2023-09-12 ASSESSMENT — ENCOUNTER SYMPTOMS
ABDOMINAL PAIN: 0
VOMITING: 0
COUGH: 0
SHORTNESS OF BREATH: 0
NAUSEA: 0

## 2023-09-12 NOTE — PROGRESS NOTES
[facility]) Physicians   Rheumatology Clinic Note      2023       CHIEF COMPLAINT:    Chief Complaint   Patient presents with    3 Month Follow-Up     Neuropathic pain of both feet    Joint Pain     Mid/lower back pain, toes and Rt arm. Pain score 8           HISTORY OF PRESENT ILLNESS:    52 y.o. female with osteoarthritis, degenerative cervical spine, cervical radiculpathy presents today for urgent evaluation of pain in the feet. She is on Lyrica 50 mg bid and Celebrex 200 mg daily. Past med: gabapentin (weight gain)    Continues to have pain in her lower back and legs. Prolonged standing and walking aggravates the pain. She is concerned about recent weight gain. She is scheduled to see pain management tomorrow for initial consultation. H/o neck surgery 2019 and has been having problems with neck since then. H/o migraines      Past Medical History:     has a past medical history of Arthritis and Migraines. Past Surgical History:     has a past surgical history that includes  section; Hysterectomy; cervical fusion (); [facility] US GUIDED BREAST BIOPSY W LOC DEVICE 1ST LESION RIGHT (Right, 10/21/2021); Salpingo-oophorectomy (N/A, 2022); and Colonoscopy (2022). Current Medications:      Current Outpatient Medications:     etodolac (LODINE) 400 MG tablet, Take 1 tablet by mouth 2 times daily, Disp: 60 tablet, Rfl: 2    pregabalin (LYRICA) 50 MG capsule, Take 1 capsule by mouth 3 times daily for 90 days.  Max Daily Amount: 150 mg, Disp: 60 capsule, Rfl: 3    furosemide (LASIX) 20 MG tablet, Take 1 tablet by mouth daily as needed (lower leg edema), Disp: 60 tablet, Rfl: 3    potassium chloride (KLOR-CON M) 10 MEQ extended release tablet, Take 1 tablet by mouth daily Take with lasix, Disp: 30 tablet, Rfl: 5    vitamin B-12 (CYANOCOBALAMIN) 1000 MCG tablet, Take 1 tablet by mouth daily, Disp: , Rfl:     topiramate ER (TROKENDI XR) 100 MG CP24, TAKE 1 CAPSULE BY MOUTH EVERY DAY, Disp:

## 2023-09-13 ENCOUNTER — OFFICE VISIT (OUTPATIENT)
Dept: PHYSICAL MEDICINE AND REHAB | Age: 53
End: 2023-09-13
Payer: COMMERCIAL

## 2023-09-13 VITALS
DIASTOLIC BLOOD PRESSURE: 62 MMHG | HEIGHT: 65 IN | WEIGHT: 191 LBS | BODY MASS INDEX: 31.82 KG/M2 | SYSTOLIC BLOOD PRESSURE: 106 MMHG

## 2023-09-13 DIAGNOSIS — M47.816 FACET HYPERTROPHY OF LUMBAR REGION: ICD-10-CM

## 2023-09-13 DIAGNOSIS — M54.12 CERVICAL RADICULOPATHY: ICD-10-CM

## 2023-09-13 DIAGNOSIS — Z98.1 S/P CERVICAL SPINAL FUSION: ICD-10-CM

## 2023-09-13 DIAGNOSIS — M54.12 CERVICAL RADICULITIS: ICD-10-CM

## 2023-09-13 DIAGNOSIS — M47.812 CERVICAL SPONDYLOSIS: ICD-10-CM

## 2023-09-13 DIAGNOSIS — M53.3 SACROILIAC JOINT PAIN: ICD-10-CM

## 2023-09-13 DIAGNOSIS — M79.18 MYOFASCIAL PAIN: ICD-10-CM

## 2023-09-13 DIAGNOSIS — M47.816 LUMBAR SPONDYLOSIS: ICD-10-CM

## 2023-09-13 DIAGNOSIS — M53.3 SI (SACROILIAC) JOINT DYSFUNCTION: ICD-10-CM

## 2023-09-13 DIAGNOSIS — M79.2 NEUROPATHIC PAIN: ICD-10-CM

## 2023-09-13 DIAGNOSIS — M54.16 LUMBAR RADICULOPATHY: Primary | ICD-10-CM

## 2023-09-13 PROCEDURE — 99205 OFFICE O/P NEW HI 60 MIN: CPT | Performed by: NURSE PRACTITIONER

## 2023-09-13 RX ORDER — TRAMADOL HYDROCHLORIDE 50 MG/1
50 TABLET ORAL DAILY PRN
Qty: 7 TABLET | Refills: 0 | Status: SHIPPED | OUTPATIENT
Start: 2023-09-13 | End: 2023-09-20

## 2023-09-13 RX ORDER — CYCLOBENZAPRINE HCL 10 MG
10 TABLET ORAL 3 TIMES DAILY PRN
COMMUNITY
End: 2023-09-13

## 2023-09-13 NOTE — PROGRESS NOTES
Chronic Pain/PM&R Clinic Note     Encounter Date: 9/13/23    Subjective:   Chief Complaint:   Chief Complaint   Patient presents with    Back Pain       History of Present Illness:   Darby Miner is a 52 y.o. female seen in the clinic initially on 9/13/2023 upon request from Dr Rocio Mccoy for her history of neck and low back pain. She has a personal medical history of cervical fusion, arthritis, migraines. Patient presents with complaints of pain in her bilateral neck, that radiates to the tops of her shoulder, and down her right arm to her fingertips. She reports she has occasional left arm pain to her fingertipes as well. She reports pain has been occurring for years and years. She denies any accident or injury that caused her pain. She states she had a antiror fusion in 2019, and it did not help her pain at all. She states she actually feels like it made it worse. She describes her neck pain as sharp pain occasionally, with constant aching and nagging  pain. She states she gets numbness, tingling down her arms. She does report she has been dropping things at work and feels like she has decreased sensation in her fingertips. She states that pain is worse with lifting, raising her arms, house work, showering, using the bathroom, work. 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 She states pain is somewhat better with certain positions, heat, but nothing lasts. She states she is not sleeping much due to pain, and that she does get migraines from her neck pain. She does note that pain is worse in the morning, but can be just as bad throughout the day. She reports that she did physical therapy after her surgery, but none since. She reports that she tries to be active, but does not do a specific program.     She is also complaining of bilateral low back pain that goes into her buttocks and down her posterior thighs to her knees.  She state this pain has been occurring for a

## 2023-09-13 NOTE — PROGRESS NOTES
Functionality Assessment/Goals Worksheet     On a scale of 0 (Does not Interfere) to 10 (Completely Interferes)     1. Which number describes how during the past week pain has interfered with           the following:  A. General Activity:  8  B. Mood: 10  C. Walking Ability:  6  D. Normal Work (Includes both work outside the home and housework):  8  E. Relations with Other People:   6  F. Sleep:   10  G. Enjoyment of Life:   9    2. Patient Prefers to Take their Pain Medications:     [x]  On a regular basis   []  Only when necessary    []  Does not take pain medications    3. What are the Patient's Goals/Expectations for Visiting Pain Management? []  Learn about my pain    []  Receive Medication   []  Physical Therapy     []  Treat Depression   []  Receive Injections    []  Treat Sleep   []  Deal with Anxiety and Stress   []  Treat Opoid Dependence/Addiction   [x]  Other: Pain control ?

## 2023-09-14 ENCOUNTER — PATIENT MESSAGE (OUTPATIENT)
Dept: PHYSICAL MEDICINE AND REHAB | Age: 53
End: 2023-09-14

## 2023-09-19 ENCOUNTER — PATIENT MESSAGE (OUTPATIENT)
Dept: RHEUMATOLOGY | Age: 53
End: 2023-09-19

## 2023-09-19 NOTE — TELEPHONE ENCOUNTER
From: Ulises Bullock  To: Dr. Bess Backbone: 9/19/2023 2:48 PM EDT  Subject: Pain    Dr Montano Goes,  Just an update,  I am not having any relief with the nw med added on. I will give it more time if ou need me to. But just wanted to update you! I called off work today due to the pain. Just hurts too bad in my back and my feet and hands.   Eber Lewis

## 2023-11-16 PROBLEM — M79.7 FIBROMYALGIA: Status: ACTIVE | Noted: 2023-11-16

## 2024-04-08 ENCOUNTER — OFFICE VISIT (OUTPATIENT)
Dept: RHEUMATOLOGY | Age: 54
End: 2024-04-08
Payer: COMMERCIAL

## 2024-04-08 VITALS
SYSTOLIC BLOOD PRESSURE: 116 MMHG | DIASTOLIC BLOOD PRESSURE: 66 MMHG | HEART RATE: 80 BPM | OXYGEN SATURATION: 97 % | BODY MASS INDEX: 31.46 KG/M2 | HEIGHT: 65 IN | WEIGHT: 188.8 LBS

## 2024-04-08 DIAGNOSIS — M54.50 CHRONIC MIDLINE LOW BACK PAIN WITHOUT SCIATICA: Primary | ICD-10-CM

## 2024-04-08 DIAGNOSIS — G89.29 CHRONIC MIDLINE LOW BACK PAIN WITHOUT SCIATICA: Primary | ICD-10-CM

## 2024-04-08 DIAGNOSIS — M51.36 DDD (DEGENERATIVE DISC DISEASE), LUMBAR: ICD-10-CM

## 2024-04-08 PROCEDURE — 99214 OFFICE O/P EST MOD 30 MIN: CPT | Performed by: INTERNAL MEDICINE

## 2024-04-08 ASSESSMENT — ENCOUNTER SYMPTOMS
SHORTNESS OF BREATH: 0
NAUSEA: 0
ABDOMINAL PAIN: 0
VOMITING: 0
COUGH: 0

## 2024-04-10 ENCOUNTER — PATIENT MESSAGE (OUTPATIENT)
Dept: RHEUMATOLOGY | Age: 54
End: 2024-04-10

## 2024-04-11 NOTE — TELEPHONE ENCOUNTER
From: Estefania Gutiérrez  To: Dr. Gabby Jimenez  Sent: 4/10/2024 5:12 PM EDT  Subject: Phone number    Hi, Dr. Jimenez,  You referred me to physical therapy, and I haven't heard from them yet, but I was just thinking that it might be because I have a different number. It's after 5 so I thought I would just message you with it. [phone]. Sorry.    Thank you,  Estefania Gutiérrez

## 2024-04-25 ENCOUNTER — HOSPITAL ENCOUNTER (OUTPATIENT)
Dept: PHYSICAL THERAPY | Age: 54
Setting detail: THERAPIES SERIES
Discharge: HOME | End: 2024-04-25
Payer: COMMERCIAL

## 2024-04-25 PROCEDURE — 97161 PT EVAL LOW COMPLEX 20 MIN: CPT

## 2024-04-25 PROCEDURE — G0283 ELEC STIM OTHER THAN WOUND: HCPCS

## 2024-04-25 NOTE — PROGRESS NOTES
** PLEASE SIGN, DATE AND TIME CERTIFICATION BELOW AND RETURN TO [facility] OUTPATIENT REHABILITATION (FAX #: [phone]).  ATTEST/CO-SIGN IF ACCESSING VIA [employer]  THANK YOU.**    I certify that I have examined the patient below and determined that Physical Medicine and Rehabilitation service is necessary and that I approve the established plan of care for up to 90 days or as specifically noted.  Attestation, signature or co-signature of physician indicates approval of certification requirements.    ________________________ ____________ __________  Physician Signature   Date   Time  [facility]  PHYSICAL THERAPY  [x] EVALUATION  [] DAILY NOTE (LAND) [] DAILY NOTE (AQUATIC ) [] PROGRESS NOTE [] DISCHARGE NOTE    [] OUTPATIENT REHABILITATION CENTER [facility]   [x] [facility] [place]    [] [facility]   [] [facility]    Date: 2024  Patient Name:  Estefania Gutiérrez  : 1970  MRN: [id number]  CSN: [id number]    Referring Practitioner Gabby Jimenez MD    Diagnosis Low back pain, unspecified  Other chronic pain                            Treatment Diagnosis M54.6  Thoracic Pain  M54.41 Lumbago with Sciatica, Right Side  M54.42  Lumbago with Sciatica, Left Side  R53.1 Weakness  M25.60 Stiffness of Unspecified Joint   Date of Evaluation 24    Additional Pertinent History Arthritis, fibromyalgia. Neck fusion .       Functional Outcome Measure Used Modified Oswestry   Functional Outcome Score 27/50= 54% (24)       Insurance: Primary: Payor: [employer] /  /  / ,   Secondary:    Authorization Information: PRE CERTIFICATION REQUIRED: No precert required.  INSURANCE THERAPY BENEFIT: Allowed 60 visits per calendar year.  0 visits have been used.. PT/OT/ST COMBINED Hard Max..   AQUATIC THERAPY COVERED: Yes  MODALITIES COVERED:  Yes   Approved Procedure Codes: Authorization of Specific CPT Codes Not Required  (Codes requested indicated by red font, codes approved

## 2024-04-30 ENCOUNTER — APPOINTMENT (OUTPATIENT)
Dept: PHYSICAL THERAPY | Age: 54
End: 2024-04-30
Payer: COMMERCIAL

## 2024-05-01 ENCOUNTER — HOSPITAL ENCOUNTER (OUTPATIENT)
Dept: PHYSICAL THERAPY | Age: 54
Setting detail: THERAPIES SERIES
End: 2024-05-01
Payer: COMMERCIAL

## 2024-05-02 ENCOUNTER — APPOINTMENT (OUTPATIENT)
Dept: PHYSICAL THERAPY | Age: 54
End: 2024-05-02
Payer: COMMERCIAL

## 2024-05-07 ENCOUNTER — HOSPITAL ENCOUNTER (OUTPATIENT)
Dept: WOMENS IMAGING | Age: 54
Discharge: HOME | End: 2024-05-07

## 2024-05-07 ENCOUNTER — APPOINTMENT (OUTPATIENT)
Dept: PHYSICAL THERAPY | Age: 54
End: 2024-05-07
Payer: COMMERCIAL

## 2024-05-07 DIAGNOSIS — Z12.31 VISIT FOR SCREENING MAMMOGRAM: ICD-10-CM

## 2024-05-08 ENCOUNTER — HOSPITAL ENCOUNTER (OUTPATIENT)
Dept: WOMENS IMAGING | Age: 54
Discharge: HOME | End: 2024-05-08
Payer: COMMERCIAL

## 2024-05-08 DIAGNOSIS — R92.8 ABNORMAL MAMMOGRAM: ICD-10-CM

## 2024-05-08 DIAGNOSIS — N63.10 MASS OF RIGHT BREAST, UNSPECIFIED QUADRANT: ICD-10-CM

## 2024-05-08 PROCEDURE — 76642 ULTRASOUND BREAST LIMITED: CPT

## 2024-05-08 PROCEDURE — G0279 TOMOSYNTHESIS, MAMMO: HCPCS

## 2024-05-09 DIAGNOSIS — N64.89 HEMATOMA OF RIGHT BREAST: ICD-10-CM

## 2024-05-09 DIAGNOSIS — Z09 FOLLOW-UP EXAM: Primary | ICD-10-CM

## 2024-05-10 ENCOUNTER — APPOINTMENT (OUTPATIENT)
Dept: PHYSICAL THERAPY | Age: 54
End: 2024-05-10
Payer: COMMERCIAL

## 2024-05-14 ENCOUNTER — HOSPITAL ENCOUNTER (OUTPATIENT)
Dept: PHYSICAL THERAPY | Age: 54
Setting detail: THERAPIES SERIES
Discharge: HOME | End: 2024-05-14
Payer: COMMERCIAL

## 2024-05-14 PROCEDURE — 97110 THERAPEUTIC EXERCISES: CPT

## 2024-05-14 PROCEDURE — 97032 APPL MODALITY 1+ESTIM EA 15: CPT

## 2024-05-14 NOTE — PROGRESS NOTES
Pt takes ibuprofen for pain. Pt denies use of ice or heat.      SUBJECTIVE: Patient reporting that her pain is 7/10 today. Patient did cancel 1st 5 appointments following the evaluation.      Objective:    TREATMENT   Precautions:    Pain: 7/10 mid back to tailbone    \"X” in shaded column indicates activity completed today    “*\" next to exercise/intervention indicates progression   Modalities Parameters/  Location  Notes   IFC estim to thoracolumbar left side lying 15 min, intensity 25.5mA x                Manual Therapy Time/Technique  Notes                     Exercise/Intervention   Notes   Abdominal bracing * 10x5 sec  x    SAQ* 10x5 sec  x    Shoulder flexion with bolster still behind knees * 10 alt  x    Quad sets* 10x5 sec  x    Bent knee fall out*                     Seated:       LAQ* 10  x                  Rolling from left side lying to supine   x Pt slow and guarded with painful behaviors     Specific Interventions Next Treatment: lumbar and hip stretches, manual to thoracolumbar spine and SI region, piriformis release, dry needling as needed, core and hip strengthening    Activity/Treatment Tolerance:  []  Patient tolerated treatment well  []  Patient limited by fatigue  []  Patient limited by pain   []  Patient limited by medical complications  []  Other:     Assessment:  Continued with estim with patient tolerating well. Was able to initiate gentle strengthening exercises with patient having fair tolerance. Pain with bed mobility and increased pain with progressions.       Goals    Patient Goal: To not hurt, tolerable pain level 3/10    Short Term Goals: 4 weeks  Patient will demonstrate good core engagement and postural awareness during therapy session with <3 cues to tolerate sitting >30 minutes.  Patient will have <50% lumbar and hip AROM restriction for ease bending for LE dressing.   Patient will improve B hip flexion strength to 4/5 for stabilization when vacuuming.      Long Term Goals: 8

## 2024-05-16 ENCOUNTER — HOSPITAL ENCOUNTER (OUTPATIENT)
Dept: PHYSICAL THERAPY | Age: 54
Setting detail: THERAPIES SERIES
Discharge: HOME | End: 2024-05-16
Payer: COMMERCIAL

## 2024-05-16 PROCEDURE — 97032 APPL MODALITY 1+ESTIM EA 15: CPT

## 2024-05-16 PROCEDURE — 97110 THERAPEUTIC EXERCISES: CPT

## 2024-05-16 NOTE — PROGRESS NOTES
[facility]  PHYSICAL THERAPY  [] EVALUATION  [x] DAILY NOTE (LAND) [] DAILY NOTE (AQUATIC ) [] PROGRESS NOTE [] DISCHARGE NOTE    [] OUTPATIENT REHABILITATION CENTER - [place]   [x] [facility]    [] [facility]   [] [facility]    Date: 2024  Patient Name:  Estefania Gutiérrez  : 1970  MRN: [id number]  CSN: [id number]    Referring Practitioner Gabby Jimenez MD    Diagnosis Low back pain, unspecified  Other chronic pain                            Treatment Diagnosis M54.6  Thoracic Pain  M54.41 Lumbago with Sciatica, Right Side  M54.42  Lumbago with Sciatica, Left Side  R53.1 Weakness  M25.60 Stiffness of Unspecified Joint   Date of Evaluation 24    Additional Pertinent History Arthritis, fibromyalgia. Neck fusion .       Functional Outcome Measure Used Modified Oswestry   Functional Outcome Score 27/50= 54% (24)       Insurance: Primary: Payor: [employer] /  /  / ,   Secondary:    Authorization Information: PRE CERTIFICATION REQUIRED: No precert required.  INSURANCE THERAPY BENEFIT: Allowed 60 visits per calendar year.  0 visits have been used.. PT/OT/ST COMBINED Hard Max..   AQUATIC THERAPY COVERED: Yes  MODALITIES COVERED:  Yes   Approved Procedure Codes: Authorization of Specific CPT Codes Not Required  (Codes requested indicated by red font, codes approved indicated by black font)   Visit # 2, 2/10 for progress note (Reporting Period: 24 to 24    )   Visits Allowed: 60   Recertification Date: 24   Physician Follow-Up: 10/8/24   Physician Orders:    History of Present Illness: Pt presents with chronic mid to low back pain, down to tailbone. Pain is progressively worsening. Pt hasn't had therapy on her back. Pain is constant, with prolonged sitting and vacuuming aggravate pain. Pt tolerates 30 minutes at grocery store. Bending over for LE dressing is bothersome. XR 2023 showed spondylosis scattered in thoracic and lumbar spine.

## 2024-05-21 ENCOUNTER — APPOINTMENT (OUTPATIENT)
Dept: PHYSICAL THERAPY | Age: 54
End: 2024-05-21
Payer: COMMERCIAL

## 2024-05-23 ENCOUNTER — HOSPITAL ENCOUNTER (OUTPATIENT)
Dept: PHYSICAL THERAPY | Age: 54
Setting detail: THERAPIES SERIES
Discharge: HOME | End: 2024-05-23
Payer: COMMERCIAL

## 2024-05-23 PROCEDURE — 97110 THERAPEUTIC EXERCISES: CPT

## 2024-05-23 PROCEDURE — 97032 APPL MODALITY 1+ESTIM EA 15: CPT

## 2024-05-23 NOTE — PROGRESS NOTES
[facility]  PHYSICAL THERAPY  [] EVALUATION  [x] DAILY NOTE (LAND) [] DAILY NOTE (AQUATIC ) [] PROGRESS NOTE [] DISCHARGE NOTE    [] [facility] [place]   [x] [place] [facility]    [] [facility]   [] [facility]    Date: 2024  Patient Name:  Estefania Gutiérrez  : 1970  MRN: [id number]  CSN: [id number]    Referring Practitioner Gabby Jimenez MD    Diagnosis Low back pain, unspecified  Other chronic pain                            Treatment Diagnosis M54.6  Thoracic Pain  M54.41 Lumbago with Sciatica, Right Side  M54.42  Lumbago with Sciatica, Left Side  R53.1 Weakness  M25.60 Stiffness of Unspecified Joint   Date of Evaluation 24    Additional Pertinent History Arthritis, fibromyalgia. Neck fusion .       Functional Outcome Measure Used Modified Oswestry   Functional Outcome Score 27/50= 54% (24)       Insurance: Primary: Payor: BCBS /  /  / ,   Secondary:    Authorization Information: PRE CERTIFICATION REQUIRED: No precert required.  INSURANCE THERAPY BENEFIT: Allowed 60 visits per calendar year.  0 visits have been used.. PT/OT/ST COMBINED Hard Max..   AQUATIC THERAPY COVERED: Yes  MODALITIES COVERED:  Yes   Approved Procedure Codes: Authorization of Specific CPT Codes Not Required  (Codes requested indicated by red font, codes approved indicated by black font)   Visit # 4, 4/10 for progress note (Reporting Period: 24 to 24    )   Visits Allowed: 60   Recertification Date: 24   Physician Follow-Up: 10/8/24   Physician Orders:    History of Present Illness: Pt presents with chronic mid to low back pain, down to tailbone. Pain is progressively worsening. Pt hasn't had therapy on her back. Pain is constant, with prolonged sitting and vacuuming aggravate pain. Pt tolerates 30 minutes at grocery store. Bending over for LE dressing is bothersome. XR 2023 showed spondylosis scattered in thoracic and lumbar spine.

## 2024-05-28 ENCOUNTER — HOSPITAL ENCOUNTER (OUTPATIENT)
Dept: PHYSICAL THERAPY | Age: 54
Setting detail: THERAPIES SERIES
Discharge: HOME | End: 2024-05-28
Payer: COMMERCIAL

## 2024-05-28 PROCEDURE — 97032 APPL MODALITY 1+ESTIM EA 15: CPT

## 2024-05-28 PROCEDURE — 97110 THERAPEUTIC EXERCISES: CPT

## 2024-05-28 NOTE — PROGRESS NOTES
[facility]  PHYSICAL THERAPY  [] EVALUATION  [x] DAILY NOTE (LAND) [] DAILY NOTE (AQUATIC ) [] PROGRESS NOTE [] DISCHARGE NOTE    [] [facility] [place]   [x] [place] [facility]    [] [facility]   [] [facility]    Date: 2024  Patient Name:  Estefania Gutiérrez  : 1970  MRN: [id number]  CSN: [id number]    Referring Practitioner Gabby Jimenez MD    Diagnosis Low back pain, unspecified  Other chronic pain                            Treatment Diagnosis M54.6  Thoracic Pain  M54.41 Lumbago with Sciatica, Right Side  M54.42  Lumbago with Sciatica, Left Side  R53.1 Weakness  M25.60 Stiffness of Unspecified Joint   Date of Evaluation 24    Additional Pertinent History Arthritis, fibromyalgia. Neck fusion .       Functional Outcome Measure Used Modified Oswestry   Functional Outcome Score 27/50= 54% (24)       Insurance: Primary: Payor: BCBS /  /  / ,   Secondary:    Authorization Information: PRE CERTIFICATION REQUIRED: No precert required.  INSURANCE THERAPY BENEFIT: Allowed 60 visits per calendar year.  0 visits have been used.. PT/OT/ST COMBINED Hard Max..   AQUATIC THERAPY COVERED: Yes  MODALITIES COVERED:  Yes   Approved Procedure Codes: Authorization of Specific CPT Codes Not Required  (Codes requested indicated by red font, codes approved indicated by black font)   Visit # 5, 5/10 for progress note (Reporting Period: 24 to 24    )   Visits Allowed: 60   Recertification Date: 24   Physician Follow-Up: 10/8/24   Physician Orders:    History of Present Illness: Pt presents with chronic mid to low back pain, down to tailbone. Pain is progressively worsening. Pt hasn't had therapy on her back. Pain is constant, with prolonged sitting and vacuuming aggravate pain. Pt tolerates 30 minutes at grocery store. Bending over for LE dressing is bothersome. XR 2023 showed spondylosis scattered in thoracic and lumbar spine.

## 2024-05-30 ENCOUNTER — PATIENT MESSAGE (OUTPATIENT)
Dept: RHEUMATOLOGY | Age: 54
End: 2024-05-30

## 2024-05-30 ENCOUNTER — HOSPITAL ENCOUNTER (OUTPATIENT)
Dept: PHYSICAL THERAPY | Age: 54
Setting detail: THERAPIES SERIES
Discharge: HOME | End: 2024-05-30
Payer: COMMERCIAL

## 2024-05-30 PROCEDURE — 97032 APPL MODALITY 1+ESTIM EA 15: CPT

## 2024-05-30 PROCEDURE — 97110 THERAPEUTIC EXERCISES: CPT

## 2024-05-30 NOTE — PROGRESS NOTES
[facility]  PHYSICAL THERAPY  [] EVALUATION  [x] DAILY NOTE (LAND) [] DAILY NOTE (AQUATIC ) [] PROGRESS NOTE [] DISCHARGE NOTE    [] [facility] [place]   [x] [place] [facility]    [] [facility]   [] [facility]    Date: 2024  Patient Name:  Estefania Gutiérrez  : 1970  MRN: [id number]  CSN: [id number]    Referring Practitioner Gabby Jimenez MD    Diagnosis Low back pain, unspecified  Other chronic pain                            Treatment Diagnosis M54.6  Thoracic Pain  M54.41 Lumbago with Sciatica, Right Side  M54.42  Lumbago with Sciatica, Left Side  R53.1 Weakness  M25.60 Stiffness of Unspecified Joint   Date of Evaluation 24    Additional Pertinent History Arthritis, fibromyalgia. Neck fusion .       Functional Outcome Measure Used Modified Oswestry   Functional Outcome Score 27/50= 54% (24)       Insurance: Primary: Payor: BCBS /  /  / ,   Secondary:    Authorization Information: PRE CERTIFICATION REQUIRED: No precert required.  INSURANCE THERAPY BENEFIT: Allowed 60 visits per calendar year.  0 visits have been used.. PT/OT/ST COMBINED Hard Max..   AQUATIC THERAPY COVERED: Yes  MODALITIES COVERED:  Yes   Approved Procedure Codes: Authorization of Specific CPT Codes Not Required  (Codes requested indicated by red font, codes approved indicated by black font)   Visit # 6, 6/10 for progress note (Reporting Period: 24 to 24    )   Visits Allowed: 60   Recertification Date: 24   Physician Follow-Up: 10/8/24   Physician Orders:    History of Present Illness: Pt presents with chronic mid to low back pain, down to tailbone. Pain is progressively worsening. Pt hasn't had therapy on her back. Pain is constant, with prolonged sitting and vacuuming aggravate pain. Pt tolerates 30 minutes at grocery store. Bending over for LE dressing is bothersome. XR 2023 showed spondylosis scattered in thoracic and lumbar spine.

## 2024-05-31 NOTE — TELEPHONE ENCOUNTER
From: Estefania Gutiérrez  To: Dr. Gabby Jimenez  Sent: 5/30/2024 5:43 PM EDT  Subject: Physical Therapy     Hi Dr Jimenez,  Next week is my last scheduled week of therapy. It's not really doing anything unfortunately, I usually leave feeling the same or worse.My back hurts me all the time, increasing if I'm standing or sitting too long. Also interrupting my sleep. I'm having numbness in my right leg and more numbness, tingling and oddly enough pain in my hands. My neck also. So thinking therapy is not the route for me. They told me to let you know. I know I am a hopeless case. Due to fibromyalgia, arthritis, my neck surgery, Degenerative disc etc. But it's just miserable and walking, standing and sitting shouldn't be a problem! I have a had time explaining it all because I don't understand it myself and I feel at such a loss. Just keeping you informed.   Thank you.   Estefania Gutiérrez

## 2024-06-07 ENCOUNTER — HOSPITAL ENCOUNTER (OUTPATIENT)
Dept: PHYSICAL THERAPY | Age: 54
Setting detail: THERAPIES SERIES
Discharge: HOME | End: 2024-06-07
Payer: COMMERCIAL

## 2024-06-07 PROCEDURE — 97110 THERAPEUTIC EXERCISES: CPT

## 2024-06-07 NOTE — DISCHARGE SUMMARY
and lumbar spine. Pt takes ibuprofen for pain. Pt denies use of ice or heat.      SUBJECTIVE:reports back to MD and is being referred to pain clinic.  Did purchase TENS and PT education on care and use, also reviewed need to do HEP daily.  LBP today 8/10, high 10/10.  Reports R LE pain to knee 8/10, L LE to knee pain intermittently 8/10, both LE pains go down ankle      Objective:    TREATMENT   Precautions:    Pain: 810 mid back to tailbone    \"X” in shaded column indicates activity completed today    “*\" next to exercise/intervention indicates progression   Modalities Parameters/  Location  Notes   IFC estim to thoracolumbar left side lying- manual prior to PT 10 min, intensity 24mA x Supine concurrent with exercises today*               Manual Therapy Time/Technique  Notes                     Exercise/Intervention   Notes   Abdominal bracing  12x5 sec  x    SAQ 10x5 sec  x    Shoulder flexion with bolster still behind knees  15 alt  x    Quad sets 15x5 sec  x    Bent knee fall out 10  x    Hip adduction with ball 12x5 sec  x    Piriformis stretch  2x20 sec             Seated:       LAQ 10      Heel/toe raises 10      Calf stretch with strap  3x10* sec  x    Hamstring stretch  3x10* sec  x      Specific Interventions Next Treatment: lumbar and hip stretches, manual to thoracolumbar spine and SI region, piriformis release, dry needling as needed, core and hip strengthening    Activity/Treatment Tolerance:  []  Patient tolerated treatment well  []  Patient limited by fatigue  [x]  Patient limited by pain   []  Patient limited by medical complications  []  Other:     Assessment:  discharge per MD, referral to pain clinic due to continued high pain levels.  PT education on use of TENS unit and daily HEP.  Patient has met goals for AROM and strength but high pain levels continue.  Discharge to HEP and TENS unit and is awaiting referral to pain clinic    Goals    Patient Goal: To not hurt, tolerable pain level

## 2024-06-25 ENCOUNTER — TELEPHONE (OUTPATIENT)
Dept: PHYSICAL MEDICINE AND REHAB | Age: 54
End: 2024-06-25

## 2024-06-25 NOTE — TELEPHONE ENCOUNTER
Pt called for f/u and you have not seen since 9/13/23. Put on schedule for 40min.s. Let me know if do not want 40min.s

## 2024-06-28 ENCOUNTER — HOSPITAL ENCOUNTER (EMERGENCY)
Age: 54
Discharge: HOME | End: 2024-06-28
Attending: EMERGENCY MEDICINE
Payer: COMMERCIAL

## 2024-06-28 VITALS
OXYGEN SATURATION: 98 % | BODY MASS INDEX: 31.32 KG/M2 | HEART RATE: 61 BPM | DIASTOLIC BLOOD PRESSURE: 69 MMHG | RESPIRATION RATE: 16 BRPM | WEIGHT: 188 LBS | HEIGHT: 65 IN | TEMPERATURE: 97.9 F | SYSTOLIC BLOOD PRESSURE: 105 MMHG

## 2024-06-28 DIAGNOSIS — U07.1 COVID-19: Primary | ICD-10-CM

## 2024-06-28 DIAGNOSIS — R51.9 ACUTE NONINTRACTABLE HEADACHE, UNSPECIFIED HEADACHE TYPE: ICD-10-CM

## 2024-06-28 LAB
INFLUENZA A BY PCR: NOT DETECTED
INFLUENZA B BY PCR: NOT DETECTED
SARS-COV-2 RNA, RT PCR: DETECTED

## 2024-06-28 PROCEDURE — 87636 SARSCOV2 & INF A&B AMP PRB: CPT

## 2024-06-28 PROCEDURE — 6370000000 HC RX 637 (ALT 250 FOR IP): Performed by: EMERGENCY MEDICINE

## 2024-06-28 PROCEDURE — 99283 EMERGENCY DEPT VISIT LOW MDM: CPT

## 2024-06-28 RX ORDER — HYDROCODONE BITARTRATE AND ACETAMINOPHEN 5; 325 MG/1; MG/1
1 TABLET ORAL EVERY 6 HOURS PRN
Qty: 8 TABLET | Refills: 0 | Status: SHIPPED | OUTPATIENT
Start: 2024-06-28 | End: 2024-06-30

## 2024-06-28 RX ORDER — IBUPROFEN 800 MG/1
800 TABLET ORAL ONCE
Status: COMPLETED | OUTPATIENT
Start: 2024-06-28 | End: 2024-06-28

## 2024-06-28 RX ORDER — ONDANSETRON 4 MG/1
4 TABLET, ORALLY DISINTEGRATING ORAL 3 TIMES DAILY PRN
Qty: 12 TABLET | Refills: 0 | Status: SHIPPED | OUTPATIENT
Start: 2024-06-28

## 2024-06-28 RX ADMIN — IBUPROFEN 800 MG: 800 TABLET, FILM COATED ORAL at 18:20

## 2024-06-28 ASSESSMENT — PAIN DESCRIPTION - LOCATION
LOCATION: HEAD
LOCATION: HEAD

## 2024-06-28 ASSESSMENT — ENCOUNTER SYMPTOMS
SHORTNESS OF BREATH: 0
WHEEZING: 0
DIARRHEA: 0
NAUSEA: 0
SORE THROAT: 1
COUGH: 1

## 2024-06-28 ASSESSMENT — PAIN - FUNCTIONAL ASSESSMENT: PAIN_FUNCTIONAL_ASSESSMENT: 0-10

## 2024-06-28 ASSESSMENT — PAIN SCALES - GENERAL
PAINLEVEL_OUTOF10: 9
PAINLEVEL_OUTOF10: 7

## 2024-06-28 ASSESSMENT — PAIN DESCRIPTION - DESCRIPTORS: DESCRIPTORS: DISCOMFORT

## 2024-06-28 ASSESSMENT — PAIN DESCRIPTION - ORIENTATION: ORIENTATION: ANTERIOR

## 2024-06-28 NOTE — ED PROVIDER NOTES
[facility]  eMERGENCY dEPARTMENT eNCOUnter             [facility] CARE [place]    CHIEF COMPLAINT    Chief Complaint   Patient presents with    Cough    Nasal Congestion    Facial Pain       Nurses Notes reviewed and I agree except as noted in the HPI.    HPI    Estefania Gutiérrez is a 53 y.o. female who presents with a 5-day history of nasal congestion, cough, runny nose, and worsening headache which is primarily frontal.  She has had some low-grade fevers generalized malaise and fatigue.  Zyrtec helped her congestion a little bit.  She is tolerating fluids.  She has some chest congestion, but no resting dyspnea.    REVIEW OF SYSTEMS      Review of Systems   Constitutional:  Positive for chills, fever and malaise/fatigue.   HENT:  Positive for congestion and sore throat. Negative for ear pain.    Respiratory:  Positive for cough. Negative for shortness of breath and wheezing.    Cardiovascular:  Negative for chest pain and palpitations.   Gastrointestinal:  Negative for diarrhea and nausea.   Musculoskeletal:  Positive for myalgias.   Neurological:  Positive for weakness and headaches. Negative for dizziness and focal weakness.   All other systems reviewed and are negative.        PAST MEDICAL HISTORY     has a past medical history of Arthritis, GERD (gastroesophageal reflux disease), and Migraines.    SURGICAL HISTORY     has a past surgical history that includes  section; Hysterectomy (); cervical fusion (); [facility] US GUIDED BREAST BIOPSY W LOC DEVICE 1ST LESION RIGHT (Right, 10/21/2021); Salpingo-oophorectomy (N/A, 2022); and Colonoscopy (2022).    CURRENT MEDICATIONS    Discharge Medication List as of 2024  7:52 PM        CONTINUE these medications which have NOT CHANGED    Details   fezolinetant 45 MG TABS Take 45 mg by mouth daily, Disp-30 tablet, R-11Normal      venlafaxine (EFFEXOR XR) 75 MG extended release capsule Take 1 capsule by mouth daily, Disp-30

## 2024-06-28 NOTE — DISCHARGE INSTRUCTIONS
Rest, drink plenty of fluids.  Ibuprofen 800 mg every 8 hours as needed for pain, inflammation, fever.  Hydrocodone as needed for more severe pain.  Zofran as needed for nausea.  Over-the-counter cough medication as needed.  Return to the emergency department for increasing shortness of breath, uncontrollable vomiting, uncontrollable pain, or any other signs of worsening.

## 2024-06-28 NOTE — DISCHARGE INSTR - COC
Continuity of Care Form    Patient Name: Estefania Gutiérrez   :  1970  MRN:  [id number]    Admit date:  2024  Discharge date:  ***    Code Status Order: No Order   Advance Directives:     Admitting Physician:  No admitting provider for patient encounter.  PCP: Keyla Santos, APRN - CNP    Discharging Nurse: ***  Discharging Hospital Unit/Room#: E5/E5  Discharging Unit Phone Number: ***    Emergency Contact:   Extended Emergency Contact Information  Primary Emergency Contact: Oleksandr Gutiérrez   [facility]  Home Phone: [phone]  Relation: Spouse    Past Surgical History:  Past Surgical History:   Procedure Laterality Date    CERVICAL FUSION  2019     SECTION       and     COLONOSCOPY  2022    Dr. Robles    HYSTERECTOMY (CERVIX STATUS UNKNOWN)      partial    TRA US GUID NDL BIOPSY RIGHT Right 10/21/2021    benign    SALPINGO-OOPHORECTOMY N/A 2022    ROBOTIC BSO performed by Catie Lowery MD at [facility] OR       Immunization History:   Immunization History   Administered Date(s) Administered    COVID-19, MODERNA BLUE border, Primary or Immunocompromised, (age 12y+), IM, 100 mcg/0.5mL 2021, 2021    COVID-19, MODERNA Bivalent, (age 12y+), IM, 50 mcg/0.5 mL 2022    COVID-19, MODERNA Booster BLUE border, (age 18y+), IM, 50mcg/0.25mL 2022    Influenza, AFLURIA (age 3 yrs+), FLUZONE, (age 6 mo+), MDV, 0.5mL 2023    Pneumococcal, PCV-13, PREVNAR 13, (age 6w+), IM, 0.5mL 2020    TDaP, ADACEL (age 10y-64y), BOOSTRIX (age 10y+), IM, 0.5mL 2019       Active Problems:  Patient Active Problem List   Diagnosis Code    Pelvic pain R10.2    Cervical disc disease M50.90    Anxiety F41.9    Migraine without status migrainosus, not intractable G43.909    Fibromyalgia M79.7       Isolation/Infection:   Isolation            No Isolation          Patient Infection Status       Infection Onset Added Last Indicated Last Indicated By Review Planned

## 2024-07-21 ENCOUNTER — PATIENT MESSAGE (OUTPATIENT)
Dept: RHEUMATOLOGY | Age: 54
End: 2024-07-21

## 2024-07-25 ENCOUNTER — OFFICE VISIT (OUTPATIENT)
Dept: PHYSICAL MEDICINE AND REHAB | Age: 54
End: 2024-07-25
Payer: COMMERCIAL

## 2024-07-25 VITALS
HEIGHT: 65 IN | SYSTOLIC BLOOD PRESSURE: 90 MMHG | BODY MASS INDEX: 31.32 KG/M2 | DIASTOLIC BLOOD PRESSURE: 58 MMHG | WEIGHT: 188 LBS

## 2024-07-25 DIAGNOSIS — M54.12 CERVICAL RADICULOPATHY: ICD-10-CM

## 2024-07-25 DIAGNOSIS — M79.18 MYOFASCIAL PAIN: ICD-10-CM

## 2024-07-25 DIAGNOSIS — M47.816 LUMBAR SPONDYLOSIS: ICD-10-CM

## 2024-07-25 DIAGNOSIS — Z98.1 S/P CERVICAL SPINAL FUSION: ICD-10-CM

## 2024-07-25 DIAGNOSIS — M53.3 SACROILIAC JOINT PAIN: ICD-10-CM

## 2024-07-25 DIAGNOSIS — M54.16 LUMBAR RADICULOPATHY: ICD-10-CM

## 2024-07-25 DIAGNOSIS — M53.3 SI (SACROILIAC) JOINT DYSFUNCTION: Primary | ICD-10-CM

## 2024-07-25 DIAGNOSIS — M47.816 FACET HYPERTROPHY OF LUMBAR REGION: ICD-10-CM

## 2024-07-25 DIAGNOSIS — M47.812 CERVICAL SPONDYLOSIS: ICD-10-CM

## 2024-07-25 DIAGNOSIS — M79.2 NEUROPATHIC PAIN: ICD-10-CM

## 2024-07-25 DIAGNOSIS — M54.12 CERVICAL RADICULITIS: ICD-10-CM

## 2024-07-25 PROCEDURE — 99215 OFFICE O/P EST HI 40 MIN: CPT | Performed by: NURSE PRACTITIONER

## 2024-07-25 NOTE — PROGRESS NOTES
Chronic Pain/PM&R Clinic Note     Encounter Date: 7/25/24    Subjective:   Chief Complaint:   Chief Complaint   Patient presents with    Follow-up     Follow-up       History of Present Illness:   Estefania Gutiérrez is a 53 y.o. female seen in the clinic initially on 9/13/2023 upon request from Dr Jimenez for her history of neck and low back pain. She has a personal medical history of cervical fusion, arthritis, migraines.     Patient presents with complaints of pain in her bilateral neck, that radiates to the tops of her shoulder, and down her right arm to her fingertips. She reports she has occasional left arm pain to her fingertipes as well. She reports pain has been occurring for years and years. She denies any accident or injury that caused her pain. She states she had a antiror fusion in 2019, and it did not help her pain at all. She states she actually feels like it made it worse. She describes her neck pain as sharp pain occasionally, with constant aching and nagging  pain. She states she gets numbness, tingling down her arms. She does report she has been dropping things at work and feels like she has decreased sensation in her fingertips. She states that pain is worse with lifting, raising her arms, house work, showering, using the bathroom, work. 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 She states pain is somewhat better with certain positions, heat, but nothing lasts. She states she is not sleeping much due to pain, and that she does get migraines from her neck pain. She does note that pain is worse in the morning, but can be just as bad throughout the day. She reports that she did physical therapy after her surgery, but none since. She reports that she tries to be active, but does not do a specific program.     She is also complaining of bilateral low back pain that goes into her buttocks and down her posterior thighs to her knees. She state this pain has been

## 2024-07-25 NOTE — PROGRESS NOTES
Functionality Assessment/Goals Worksheet     On a scale of 0 (Does not Interfere) to 10 (Completely Interferes)     1.  Which number describes how during the past week pain has interfered with           the following:  A.  General Activity:  7  B.  Mood: 8  C.  Walking Ability:  7  D.  Normal Work (Includes both work outside the home and housework):  7  E.  Relations with Other People:   7  F.  Sleep:   6  G.  Enjoyment of Life:   6    2.  Patient Prefers to Take their Pain Medications:     []  On a regular basis   []  Only when necessary    [x]  Does not take pain medications    3.  What are the Patient's Goals/Expectations for Visiting Pain Management?     []  Learn about my pain    []  Receive Medication   []  Physical Therapy     []  Treat Depression   []  Receive Injections    []  Treat Sleep   [x]  Deal with Anxiety and Stress   []  Treat Opoid Dependence/Addiction   []  Other:

## 2024-08-06 ENCOUNTER — HOSPITAL ENCOUNTER (OUTPATIENT)
Dept: WOMENS IMAGING | Age: 54
Discharge: HOME | End: 2024-08-06
Attending: RADIOLOGY
Payer: COMMERCIAL

## 2024-08-06 DIAGNOSIS — N64.89 HEMATOMA OF RIGHT BREAST: ICD-10-CM

## 2024-08-06 DIAGNOSIS — Z09 FOLLOW-UP EXAM: ICD-10-CM

## 2024-08-06 DIAGNOSIS — Z09 FOLLOW-UP EXAM: Primary | ICD-10-CM

## 2024-08-06 PROCEDURE — 76642 ULTRASOUND BREAST LIMITED: CPT

## 2024-08-06 PROCEDURE — G0279 TOMOSYNTHESIS, MAMMO: HCPCS

## 2024-08-08 ENCOUNTER — HOSPITAL ENCOUNTER (OUTPATIENT)
Age: 54
Discharge: HOME | End: 2024-08-08
Payer: COMMERCIAL

## 2024-08-08 LAB
BASOPHILS ABSOLUTE: 0 THOU/MM3 (ref 0–0.1)
BASOPHILS NFR BLD AUTO: 0.6 %
DEPRECATED RDW RBC AUTO: 44.7 FL (ref 35–45)
EKG ATRIAL RATE: 72 BPM
EKG P AXIS: 56 DEGREES
EKG P-R INTERVAL: 186 MS
EKG Q-T INTERVAL: 382 MS
EKG QRS DURATION: 84 MS
EKG QTC CALCULATION (BAZETT): 418 MS
EKG R AXIS: 31 DEGREES
EKG T AXIS: 49 DEGREES
EKG VENTRICULAR RATE: 72 BPM
EOSINOPHIL NFR BLD AUTO: 2.6 %
EOSINOPHILS ABSOLUTE: 0.1 THOU/MM3 (ref 0–0.4)
ERYTHROCYTE [DISTWIDTH] IN BLOOD BY AUTOMATED COUNT: 13.1 % (ref 11.5–14.5)
HCT VFR BLD AUTO: 40.5 % (ref 37–47)
HGB BLD-MCNC: 13.1 GM/DL (ref 12–16)
IMM GRANULOCYTES # BLD AUTO: 0.02 THOU/MM3 (ref 0–0.07)
IMM GRANULOCYTES NFR BLD AUTO: 0.4 %
LYMPHOCYTES ABSOLUTE: 1.8 THOU/MM3 (ref 1–4.8)
LYMPHOCYTES NFR BLD AUTO: 34.3 %
MCH RBC QN AUTO: 30 PG (ref 26–33)
MCHC RBC AUTO-ENTMCNC: 32.3 GM/DL (ref 32.2–35.5)
MCV RBC AUTO: 92.9 FL (ref 81–99)
MONOCYTES ABSOLUTE: 0.4 THOU/MM3 (ref 0.4–1.3)
MONOCYTES NFR BLD AUTO: 6.8 %
NEUTROPHILS ABSOLUTE: 2.9 THOU/MM3 (ref 1.8–7.7)
NEUTROPHILS NFR BLD AUTO: 55.3 %
NRBC BLD AUTO-RTO: 0 /100 WBC
PLATELET # BLD AUTO: 283 THOU/MM3 (ref 130–400)
PMV BLD AUTO: 10.5 FL (ref 9.4–12.4)
RBC # BLD AUTO: 4.36 MILL/MM3 (ref 4.2–5.4)
WBC # BLD AUTO: 5.3 THOU/MM3 (ref 4.8–10.8)

## 2024-08-08 PROCEDURE — 36415 COLL VENOUS BLD VENIPUNCTURE: CPT

## 2024-08-08 PROCEDURE — 87641 MR-STAPH DNA AMP PROBE: CPT

## 2024-08-08 PROCEDURE — 93005 ELECTROCARDIOGRAM TRACING: CPT | Performed by: ORTHOPAEDIC SURGERY

## 2024-08-08 PROCEDURE — 85025 COMPLETE CBC W/AUTO DIFF WBC: CPT

## 2024-08-08 PROCEDURE — 80048 BASIC METABOLIC PNL TOTAL CA: CPT

## 2024-08-08 PROCEDURE — 93010 ELECTROCARDIOGRAM REPORT: CPT | Performed by: INTERNAL MEDICINE

## 2024-08-09 LAB
ANION GAP SERPL CALC-SCNC: 14 MEQ/L (ref 8–16)
BUN SERPL-MCNC: 14 MG/DL (ref 7–22)
CALCIUM SERPL-MCNC: 9.5 MG/DL (ref 8.5–10.5)
CHLORIDE SERPL-SCNC: 109 MEQ/L (ref 98–111)
CO2 SERPL-SCNC: 22 MEQ/L (ref 23–33)
CREAT SERPL-MCNC: 0.9 MG/DL (ref 0.4–1.2)
GFR SERPL CREATININE-BSD FRML MDRD: 75 ML/MIN/1.73M2
GLUCOSE SERPL-MCNC: 102 MG/DL (ref 70–108)
MRSA DNA SPEC QL NAA+PROBE: NEGATIVE
POTASSIUM SERPL-SCNC: 3.7 MEQ/L (ref 3.5–5.2)
SODIUM SERPL-SCNC: 145 MEQ/L (ref 135–145)

## 2024-08-19 NOTE — H&P
emphasis on multimodal strategies for pain management.     Patient is taking Acetaminophen. Patient informed that the maximum amount of acetaminophen taken on a regular basis should only be 4000 mg per day.       Adjuvants:   None, patient wants to proceed with injection therapy only at this time    Interventions:   With examination consistent with bilateral sacroiliac dysfunction/pain, we will proceed with a bilateral sacroiliac joint injection, under fluoroscopy.  The risks and benefits were discussed in detail with the patient.  Patient wants to proceed with the injection with Dr Vivas    Procedure educations:  Discussed with patient about risks with procedure including infection, reaction to medication, increased pain, failure of procedures, worsening of symptoms, or bleeding.    Anticoagulation/NPO Recommendations:   Patient does not need to hold any medications prior to the procedure.  Patient will not need to be NPO x 8 hours prior to the procedure.  Local only    Multidisciplinary Pain Management:   In the presence of complex, chronic, and multi-factorial pain, the importance of a multidisciplinary approach to pain management in the patient’s management regimen was emphasized and discussed in great detail.   The patient was also advised to continue physical therapy and stretching exercises at home and cognitive behavioral and/or group therapy.     Follow-up:   4 weeks after injection    It was my pleasure to evaluate Estefania CABAN Brock today.  I spent over 45 minutes evaluating this patient, reviewing previous notes and images and completing documentation.       Sandro Vivas, DO   Interventional Pain Management/PM&R   [facility] [place]

## 2024-08-20 ENCOUNTER — APPOINTMENT (OUTPATIENT)
Dept: GENERAL RADIOLOGY | Age: 54
End: 2024-08-20
Attending: ANESTHESIOLOGY
Payer: COMMERCIAL

## 2024-08-20 ENCOUNTER — HOSPITAL ENCOUNTER (OUTPATIENT)
Age: 54
Setting detail: OUTPATIENT SURGERY
Discharge: HOME | End: 2024-08-20
Attending: ANESTHESIOLOGY | Admitting: ANESTHESIOLOGY
Payer: COMMERCIAL

## 2024-08-20 VITALS
HEART RATE: 65 BPM | BODY MASS INDEX: 30.89 KG/M2 | SYSTOLIC BLOOD PRESSURE: 108 MMHG | HEIGHT: 65 IN | WEIGHT: 185.4 LBS | DIASTOLIC BLOOD PRESSURE: 56 MMHG | RESPIRATION RATE: 16 BRPM | OXYGEN SATURATION: 100 % | TEMPERATURE: 98.3 F

## 2024-08-20 PROCEDURE — 2709999900 HC NON-CHARGEABLE SUPPLY: Performed by: ANESTHESIOLOGY

## 2024-08-20 PROCEDURE — 6360000002 HC RX W HCPCS: Performed by: ANESTHESIOLOGY

## 2024-08-20 PROCEDURE — 3600000054 HC PAIN LEVEL 3 BASE: Performed by: ANESTHESIOLOGY

## 2024-08-20 PROCEDURE — 7100000010 HC PHASE II RECOVERY - FIRST 15 MIN: Performed by: ANESTHESIOLOGY

## 2024-08-20 PROCEDURE — 2500000003 HC RX 250 WO HCPCS: Performed by: ANESTHESIOLOGY

## 2024-08-20 PROCEDURE — 7100000011 HC PHASE II RECOVERY - ADDTL 15 MIN: Performed by: ANESTHESIOLOGY

## 2024-08-20 PROCEDURE — 6360000004 HC RX CONTRAST MEDICATION: Performed by: ANESTHESIOLOGY

## 2024-08-20 PROCEDURE — 27096 INJECT SACROILIAC JOINT: CPT | Performed by: ANESTHESIOLOGY

## 2024-08-20 RX ORDER — BUPIVACAINE HYDROCHLORIDE 5 MG/ML
INJECTION, SOLUTION PERINEURAL PRN
Status: DISCONTINUED | OUTPATIENT
Start: 2024-08-20 | End: 2024-08-20 | Stop reason: ALTCHOICE

## 2024-08-20 RX ORDER — METHYLPREDNISOLONE ACETATE 80 MG/ML
INJECTION, SUSPENSION INTRA-ARTICULAR; INTRALESIONAL; INTRAMUSCULAR; SOFT TISSUE PRN
Status: DISCONTINUED | OUTPATIENT
Start: 2024-08-20 | End: 2024-08-20 | Stop reason: ALTCHOICE

## 2024-08-20 RX ORDER — LIDOCAINE HYDROCHLORIDE 10 MG/ML
INJECTION, SOLUTION EPIDURAL; INFILTRATION; INTRACAUDAL; PERINEURAL PRN
Status: DISCONTINUED | OUTPATIENT
Start: 2024-08-20 | End: 2024-08-20 | Stop reason: ALTCHOICE

## 2024-08-20 ASSESSMENT — PAIN - FUNCTIONAL ASSESSMENT
PAIN_FUNCTIONAL_ASSESSMENT: 0-10
PAIN_FUNCTIONAL_ASSESSMENT: NONE - DENIES PAIN

## 2024-08-20 ASSESSMENT — PAIN DESCRIPTION - DESCRIPTORS: DESCRIPTORS: ACHING

## 2024-08-20 NOTE — PROCEDURES
Pre-operative Diagnosis:  SI joint pain     Post-operative Diagnosis:  SI joint pain     Procedure: BILATERAL SI joint injection     Procedure Description:  After having signed the informed consent, the patient was placed in the prone position.  The patient's back was prepped with chloraprep solution, and draped in a sterile fashion. A total of 2 ml of 1% lidocaine were used to anesthetize the skin and underlying tissues.  Under fluoroscopic guidance a single 22-gauge, 3.5 inch spinal needle was advanced to lie within the inferior pole of the RIGHT sacroiliac joint.  There were no paresthesias or heme aspiration. Needle placement was confirmed in the AP view.  After negative aspiration, 0.5 ml of Omnipaque 300 contrast was injected with appropriate spread observed.  A total of 4 ml of 0.5% bupivicaine mixed with 40 mg depo-medrol were injected into the sacroiliac joint. The needle was withdrawn without any complications.  This exact procedure was repeated on the contralateral side. The patient tolerated the procedure well, was transported to the recovery room and observed for 15 minutes and discharged in an ambulatory fashion. No immediate reported complications.    Procedural Complications: None  Estimated Blood Loss: 0 mL    Sandro Vivas DO  Interventional Pain Management/PM&R   [facility]

## 2024-08-20 NOTE — PROGRESS NOTES
1025 Patient arrives to recovery room via cart.  Spontaneous respirations, vss, report received from surgical RN.  Patient denies pain, numbness, tingling , nausea.  Injection site clean, dry, intact. HOB elevated. Drink given.  Call light within reach.    1040 Up to dress self  1045 Discharge to home in stable ambulatory condition with brother.

## 2024-10-30 ENCOUNTER — OFFICE VISIT (OUTPATIENT)
Dept: PHYSICAL MEDICINE AND REHAB | Age: 54
End: 2024-10-30
Payer: COMMERCIAL

## 2024-10-30 VITALS
SYSTOLIC BLOOD PRESSURE: 134 MMHG | HEIGHT: 65 IN | DIASTOLIC BLOOD PRESSURE: 70 MMHG | BODY MASS INDEX: 30.89 KG/M2 | WEIGHT: 185.41 LBS

## 2024-10-30 DIAGNOSIS — G89.29 CHRONIC PAIN OF BOTH KNEES: ICD-10-CM

## 2024-10-30 DIAGNOSIS — M47.816 LUMBAR SPONDYLOSIS: ICD-10-CM

## 2024-10-30 DIAGNOSIS — M47.812 CERVICAL SPONDYLOSIS: ICD-10-CM

## 2024-10-30 DIAGNOSIS — M53.3 SACROILIAC JOINT PAIN: ICD-10-CM

## 2024-10-30 DIAGNOSIS — G89.4 CHRONIC PAIN SYNDROME: ICD-10-CM

## 2024-10-30 DIAGNOSIS — M53.3 SI (SACROILIAC) JOINT DYSFUNCTION: Primary | ICD-10-CM

## 2024-10-30 DIAGNOSIS — Z98.1 S/P CERVICAL SPINAL FUSION: ICD-10-CM

## 2024-10-30 DIAGNOSIS — M47.816 FACET HYPERTROPHY OF LUMBAR REGION: ICD-10-CM

## 2024-10-30 DIAGNOSIS — M25.561 CHRONIC PAIN OF BOTH KNEES: ICD-10-CM

## 2024-10-30 DIAGNOSIS — M25.562 CHRONIC PAIN OF BOTH KNEES: ICD-10-CM

## 2024-10-30 PROCEDURE — 99214 OFFICE O/P EST MOD 30 MIN: CPT | Performed by: NURSE PRACTITIONER

## 2024-10-30 RX ORDER — TOPIRAMATE 50 MG/1
50 TABLET, FILM COATED ORAL 2 TIMES DAILY
Qty: 60 TABLET | Refills: 2 | Status: SHIPPED | OUTPATIENT
Start: 2024-10-30 | End: 2024-10-30 | Stop reason: ALTCHOICE

## 2024-10-30 NOTE — PROGRESS NOTES
[facility] PROFESSIONAL SERVS  [facility] NEUROSCIENCE AND REHABILITATION [address]  [facility] [address]  Dept: [phone]  Dept Fax: [phone]  Loc: [phone]    Visit Date: 10/30/2024    Functionality Assessment/Goals Worksheet     On a scale of 0 (Does not Interfere) to 10 (Completely Interferes)     1.  Which number describes how during the past week pain has interfered with       the following:  A.  General Activity:  0  B.  Mood: 9  C.  Walking Ability:  8  D.  Normal Work (Includes both work outside the home and housework):  9  E.  Relations with Other People:   8  F.  Sleep:   9  G.  Enjoyment of Life:   8    2.  Patient Prefers to Take their Pain Medications:     []  On a regular basis   []  Only when necessary    [x]  Does not take pain medications    3.  What are the Patient's Goals/Expectations for Visiting Pain Management?     [x]  Learn about my pain    []  Receive Medication   []  Physical Therapy     []  Treat Depression   []  Receive Injections    []  Treat Sleep   []  Deal with Anxiety and Stress   []  Treat Opoid Dependence/Addiction   []  Other:

## 2024-10-30 NOTE — PROGRESS NOTES
Chronic Pain/PM&R Clinic Note     Encounter Date: 10/30/24    Subjective:   Chief Complaint:   Chief Complaint   Patient presents with    Follow-up     Follow-up       History of Present Illness:   Estefania Gutiérrez is a 54 y.o. female seen in the clinic initially on 9/13/2023 upon request from Dr Jimenez for her history of neck and low back pain. She has a personal medical history of cervical fusion, arthritis, migraines.     Patient presents with complaints of pain in her bilateral neck, that radiates to the tops of her shoulder, and down her right arm to her fingertips. She reports she has occasional left arm pain to her fingertipes as well. She reports pain has been occurring for years and years. She denies any accident or injury that caused her pain. She states she had a antiror fusion in 2019, and it did not help her pain at all. She states she actually feels like it made it worse. She describes her neck pain as sharp pain occasionally, with constant aching and nagging  pain. She states she gets numbness, tingling down her arms. She does report she has been dropping things at work and feels like she has decreased sensation in her fingertips. She states that pain is worse with lifting, raising her arms, house work, showering, using the bathroom, work. 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 She states pain is somewhat better with certain positions, heat, but nothing lasts. She states she is not sleeping much due to pain, and that she does get migraines from her neck pain. She does note that pain is worse in the morning, but can be just as bad throughout the day. She reports that she did physical therapy after her surgery, but none since. She reports that she tries to be active, but does not do a specific program.     She is also complaining of bilateral low back pain that goes into her buttocks and down her posterior thighs to her knees. She state this pain has been

## 2024-11-04 ENCOUNTER — HOSPITAL ENCOUNTER (OUTPATIENT)
Dept: WOMENS IMAGING | Age: 54
Discharge: HOME | End: 2024-11-04
Attending: RADIOLOGY
Payer: COMMERCIAL

## 2024-11-04 DIAGNOSIS — N64.89 HEMATOMA OF RIGHT BREAST: ICD-10-CM

## 2024-11-04 DIAGNOSIS — Z09 FOLLOW-UP EXAM: ICD-10-CM

## 2024-11-04 PROCEDURE — 76642 ULTRASOUND BREAST LIMITED: CPT

## 2024-11-07 ENCOUNTER — HOSPITAL ENCOUNTER (OUTPATIENT)
Dept: CT IMAGING | Age: 54
Discharge: HOME | End: 2024-11-07
Payer: COMMERCIAL

## 2024-11-07 DIAGNOSIS — H92.01 RIGHT EAR PAIN: ICD-10-CM

## 2024-11-07 DIAGNOSIS — H92.01 MASTOID PAIN, RIGHT: ICD-10-CM

## 2024-11-07 PROCEDURE — 6360000004 HC RX CONTRAST MEDICATION: Performed by: FAMILY MEDICINE

## 2024-11-07 PROCEDURE — 70470 CT HEAD/BRAIN W/O & W/DYE: CPT

## 2024-11-07 RX ORDER — IOPAMIDOL 755 MG/ML
65 INJECTION, SOLUTION INTRAVASCULAR
Status: COMPLETED | OUTPATIENT
Start: 2024-11-07 | End: 2024-11-07

## 2024-11-07 RX ADMIN — IOPAMIDOL 65 ML: 755 INJECTION, SOLUTION INTRAVENOUS at 11:35

## 2024-11-27 ENCOUNTER — OFFICE VISIT (OUTPATIENT)
Dept: PHYSICAL MEDICINE AND REHAB | Age: 54
End: 2024-11-27
Payer: COMMERCIAL

## 2024-11-27 VITALS
HEIGHT: 65 IN | DIASTOLIC BLOOD PRESSURE: 64 MMHG | BODY MASS INDEX: 32.65 KG/M2 | SYSTOLIC BLOOD PRESSURE: 130 MMHG | WEIGHT: 195.99 LBS

## 2024-11-27 DIAGNOSIS — M54.12 CERVICAL RADICULITIS: ICD-10-CM

## 2024-11-27 DIAGNOSIS — M53.3 SACROILIAC JOINT PAIN: ICD-10-CM

## 2024-11-27 DIAGNOSIS — M53.3 SI (SACROILIAC) JOINT DYSFUNCTION: ICD-10-CM

## 2024-11-27 DIAGNOSIS — M47.812 CERVICAL SPONDYLOSIS: ICD-10-CM

## 2024-11-27 DIAGNOSIS — G89.4 CHRONIC PAIN SYNDROME: ICD-10-CM

## 2024-11-27 DIAGNOSIS — M79.18 MYOFASCIAL PAIN: ICD-10-CM

## 2024-11-27 DIAGNOSIS — Z98.1 S/P CERVICAL SPINAL FUSION: ICD-10-CM

## 2024-11-27 DIAGNOSIS — M54.12 CERVICAL RADICULOPATHY: Primary | ICD-10-CM

## 2024-11-27 PROCEDURE — 99215 OFFICE O/P EST HI 40 MIN: CPT | Performed by: NURSE PRACTITIONER

## 2024-11-27 RX ORDER — DICLOFENAC SODIUM 75 MG/1
75 TABLET, DELAYED RELEASE ORAL 2 TIMES DAILY
Qty: 60 TABLET | Refills: 3 | Status: SHIPPED | OUTPATIENT
Start: 2024-11-27

## 2024-11-27 NOTE — PROGRESS NOTES
Chronic Pain/PM&R Clinic Note     Encounter Date: 11/27/24    Subjective:   Chief Complaint:   Chief Complaint   Patient presents with    Follow-up     Follow-up       History of Present Illness:   Estefania Gutiérrez is a 54 y.o. female seen in the clinic initially on 9/13/2023 upon request from Dr Jimenez for her history of neck and low back pain. She has a personal medical history of cervical fusion, arthritis, migraines.     Patient presents with complaints of pain in her bilateral neck, that radiates to the tops of her shoulder, and down her right arm to her fingertips. She reports she has occasional left arm pain to her fingertipes as well. She reports pain has been occurring for years and years. She denies any accident or injury that caused her pain. She states she had a antiror fusion in 2019, and it did not help her pain at all. She states she actually feels like it made it worse. She describes her neck pain as sharp pain occasionally, with constant aching and nagging  pain. She states she gets numbness, tingling down her arms. She does report she has been dropping things at work and feels like she has decreased sensation in her fingertips. She states that pain is worse with lifting, raising her arms, house work, showering, using the bathroom, work. 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 She states pain is somewhat better with certain positions, heat, but nothing lasts. She states she is not sleeping much due to pain, and that she does get migraines from her neck pain. She does note that pain is worse in the morning, but can be just as bad throughout the day. She reports that she did physical therapy after her surgery, but none since. She reports that she tries to be active, but does not do a specific program.     She is also complaining of bilateral low back pain that goes into her buttocks and down her posterior thighs to her knees. She state this pain has been

## 2024-11-27 NOTE — PROGRESS NOTES
[facility] PROFESSIONAL SERVS  [facility] NEUROSCIENCE AND REHABILITATION [address]  [facility] [address]  Dept: [phone]  Dept Fax: [phone]  Loc: [phone]    Visit Date: 11/27/2024    Functionality Assessment/Goals Worksheet     On a scale of 0 (Does not Interfere) to 10 (Completely Interferes)     1.  Which number describes how during the past week pain has interfered with       the following:  A.  General Activity:  9  B.  Mood: 9  C.  Walking Ability:  9  D.  Normal Work (Includes both work outside the home and housework):  9  E.  Relations with Other People:   9  F.  Sleep:   9  G.  Enjoyment of Life:   9    2.  Patient Prefers to Take their Pain Medications:     []  On a regular basis   []  Only when necessary    []  Does not take pain medications    3.  What are the Patient's Goals/Expectations for Visiting Pain Management?     [x]  Learn about my pain    [x]  Receive Medication   []  Physical Therapy     []  Treat Depression   []  Receive Injections    [x]  Treat Sleep   [x]  Deal with Anxiety and Stress   []  Treat Opoid Dependence/Addiction   []  Other:

## 2024-12-03 ENCOUNTER — OFFICE VISIT (OUTPATIENT)
Dept: RHEUMATOLOGY | Age: 54
End: 2024-12-03
Payer: COMMERCIAL

## 2024-12-03 VITALS
WEIGHT: 197.53 LBS | DIASTOLIC BLOOD PRESSURE: 70 MMHG | HEIGHT: 65 IN | HEART RATE: 84 BPM | OXYGEN SATURATION: 97 % | BODY MASS INDEX: 32.91 KG/M2 | SYSTOLIC BLOOD PRESSURE: 112 MMHG

## 2024-12-03 DIAGNOSIS — M79.641 BILATERAL HAND PAIN: ICD-10-CM

## 2024-12-03 DIAGNOSIS — M79.642 BILATERAL HAND PAIN: ICD-10-CM

## 2024-12-03 DIAGNOSIS — G62.9 NEUROPATHY: Primary | ICD-10-CM

## 2024-12-03 PROCEDURE — 99214 OFFICE O/P EST MOD 30 MIN: CPT | Performed by: INTERNAL MEDICINE

## 2024-12-03 ASSESSMENT — ENCOUNTER SYMPTOMS
COUGH: 0
NAUSEA: 0
ABDOMINAL PAIN: 0
SHORTNESS OF BREATH: 0
VOMITING: 0

## 2024-12-03 NOTE — PROGRESS NOTES
[facility]   Rheumatology Clinic Note      12/3/2024       CHIEF COMPLAINT:    Chief Complaint   Patient presents with    6 Month Follow-Up     Chronic midline low back pain without sciatica    Joint Pain     Bilateral hands and lower back pain  Pain level 8         HISTORY OF PRESENT ILLNESS:    54 y.o. female with osteoarthritis, degenerative cervical spine, cervical radiculpathy presents today for followup.  Past med: gabapentin (weight gain), Lyrica (weight gain).    Underwent right foot surgery in August for tarsal tunnel syndrome, plantar faciitis and bone spur removal.    Main concern is pain in the hands. Achy, tingling, numbness. Reports having dropping objects unintentionally. No joint swelling.     H/o neck surgery 2019 and has been having problems with neck since then.  H/o migraines      Past Medical History:     has a past medical history of Arthritis, GERD (gastroesophageal reflux disease), and Migraines.    Past Surgical History:     has a past surgical history that includes  section; Hysterectomy (); cervical fusion (); [facility] US GUIDED BREAST BIOPSY W LOC DEVICE 1ST LESION RIGHT (Right, 10/21/2021); Salpingo-oophorectomy (N/A, 2022); Colonoscopy (2022); Back Injection (Bilateral, 2024); and Foot surgery (Right, 2024).    Current Medications:      Current Outpatient Medications:     furosemide (LASIX) 20 MG tablet, Take 1 tablet by mouth as needed (daily PRN for lower leg edema), Disp: 30 tablet, Rfl: 4    potassium chloride (KLOR-CON M) 10 MEQ extended release tablet, Take 1 tablet by mouth as needed (take daily PRN with lasix), Disp: 30 tablet, Rfl: 4    SUMAtriptan (IMITREX) 50 MG tablet, Take 1 tablet by mouth daily as needed for Migraine, Disp: 30 tablet, Rfl: 3    TROKENDI  MG CP24, TAKE 1 CAPSULE BY MOUTH EVERY DAY, Disp: 90 capsule, Rfl: 3    fluticasone (FLONASE) 50 MCG/ACT nasal spray, 1 spray by Each Nostril route daily, Disp: 16 g,

## 2025-01-06 RX ORDER — PREGABALIN 50 MG/1
50 CAPSULE ORAL 3 TIMES DAILY
Qty: 90 CAPSULE | Refills: 1 | Status: CANCELLED | OUTPATIENT
Start: 2025-01-06 | End: 2025-02-05

## 2025-01-06 NOTE — TELEPHONE ENCOUNTER
Setting up the 50mg lyrica tid as previously discussed  OARRS reviewed. UDS:no data  Last seen: 11/27/2024. Follow-up:   Future Appointments   Date Time Provider Department Center   1/8/2025  8:30 AM Kayla Purdy MD N [facility] Neurology -   1/16/2025  2:45 PM Keyla Santos APRN - CNP [facility]    2/26/2025 11:00 AM Luna Guerin APRN - CNP [facility] [place]

## 2025-01-06 NOTE — TELEPHONE ENCOUNTER
This patient is not on Lyrica.  Patient was discussing diclofenac, which I have already addressed and sent into her pharmacy.

## 2025-01-08 ENCOUNTER — PROCEDURE VISIT (OUTPATIENT)
Dept: NEUROLOGY | Age: 55
End: 2025-01-08

## 2025-01-08 ENCOUNTER — TELEPHONE (OUTPATIENT)
Age: 55
End: 2025-01-08

## 2025-01-08 DIAGNOSIS — M54.2 NECK PAIN: ICD-10-CM

## 2025-01-08 DIAGNOSIS — M79.601 BILATERAL ARM PAIN: Primary | ICD-10-CM

## 2025-01-08 DIAGNOSIS — M79.602 BILATERAL ARM PAIN: Primary | ICD-10-CM

## 2025-01-08 NOTE — TELEPHONE ENCOUNTER
Please inform pt that the nerve conduction study was negative. There are no features of carpal tunnel syndrome or other pinched nerves.    I do not have a good explanation for the tingling and weakness in her hands.

## 2025-01-28 ENCOUNTER — TELEPHONE (OUTPATIENT)
Dept: PHYSICAL MEDICINE AND REHAB | Age: 55
End: 2025-01-28

## 2025-01-28 NOTE — TELEPHONE ENCOUNTER
Patient cancelled her Cervical 6/7 Epidural Sterioid Injection procedure for 01/29/25 because she has the flu. She rescheduled for 03/13/25.

## 2025-03-12 NOTE — H&P
Cervical spine radiographs dated 9/17/2022.     TECHNIQUE: Sagittal T1, T2 and STIR sequences were obtained through the cervical spine. Axial fast and echo and gradient echo T2-weighted images were obtained.     FINDINGS:  Redemonstration of ACDF at C3-4 through C5-6 with blooming artifact about the fusion constructs which partially obscures adjacent tissues. There is associated straightening/minimal reversed lordosis of the cervical spine, stable compared to prior exam.   The craniocervical junction appears intact. The cervical spinal cord is normal in caliber without focal area of abnormal T2 signal. Paraspinal soft tissues are unremarkable.  At C2-3 there is no significant spinal canal or neuroforaminal stenosis.  At C3-4 there is no significant spinal canal or neuroforaminal stenosis.  At C4-5 there are minimal residual osteophytes which mildly indents thecal sac and contributes to mild bilateral neural foraminal stenosis.  At C5-6 there is no significant spinal canal or neuroforaminal stenosis.  At C6-7 there is a minimal disc bulge without significant spinal canal stenosis and mild bilateral foraminal stenosis in association with mild uncovertebral joint degenerative change and facet hypertrophy.  At C7-T1 there is a minimal disc bulge without significant spinal canal stenosis and mild bilateral foraminal stenosis in association with mild facet hypertrophy and ligamentum flavum thickening.     IMPRESSION:     1. Redemonstration of ACDF bridging C3-C6 without acute abnormality identified.  2. Mild degenerative changes without significant spinal canal or neuroforaminal stenosis at any cervical level.     PROCEDURE: MRI LUMBAR SPINE WO CONTRAST 10/11/2022     CLINICAL INFORMATION: Cervical vertebral fusion.     COMPARISON: CT abdomen pelvis dated 10/17/2021.     TECHNIQUE: Sagittal and axial T1 and T2-weighted images were obtained through the lumbar spine.     FINDINGS:  There is anatomic vertebral body height

## 2025-03-13 ENCOUNTER — HOSPITAL ENCOUNTER (OUTPATIENT)
Age: 55
Setting detail: OUTPATIENT SURGERY
Discharge: HOME | End: 2025-03-13
Attending: ANESTHESIOLOGY | Admitting: ANESTHESIOLOGY
Payer: COMMERCIAL

## 2025-03-13 ENCOUNTER — APPOINTMENT (OUTPATIENT)
Dept: GENERAL RADIOLOGY | Age: 55
End: 2025-03-13
Attending: ANESTHESIOLOGY
Payer: COMMERCIAL

## 2025-03-13 VITALS
DIASTOLIC BLOOD PRESSURE: 56 MMHG | OXYGEN SATURATION: 96 % | TEMPERATURE: 99.5 F | SYSTOLIC BLOOD PRESSURE: 116 MMHG | HEIGHT: 66 IN | BODY MASS INDEX: 28.73 KG/M2 | RESPIRATION RATE: 16 BRPM | HEART RATE: 78 BPM | WEIGHT: 178.8 LBS

## 2025-03-13 PROCEDURE — 6360000002 HC RX W HCPCS: Performed by: ANESTHESIOLOGY

## 2025-03-13 PROCEDURE — 62321 NJX INTERLAMINAR CRV/THRC: CPT | Performed by: ANESTHESIOLOGY

## 2025-03-13 PROCEDURE — 99152 MOD SED SAME PHYS/QHP 5/>YRS: CPT | Performed by: ANESTHESIOLOGY

## 2025-03-13 PROCEDURE — 2709999900 HC NON-CHARGEABLE SUPPLY: Performed by: ANESTHESIOLOGY

## 2025-03-13 PROCEDURE — 3600000054 HC PAIN LEVEL 3 BASE: Performed by: ANESTHESIOLOGY

## 2025-03-13 PROCEDURE — 6360000004 HC RX CONTRAST MEDICATION: Performed by: ANESTHESIOLOGY

## 2025-03-13 PROCEDURE — 2500000003 HC RX 250 WO HCPCS: Performed by: ANESTHESIOLOGY

## 2025-03-13 PROCEDURE — 7100000010 HC PHASE II RECOVERY - FIRST 15 MIN: Performed by: ANESTHESIOLOGY

## 2025-03-13 PROCEDURE — 7100000011 HC PHASE II RECOVERY - ADDTL 15 MIN: Performed by: ANESTHESIOLOGY

## 2025-03-13 RX ORDER — LIDOCAINE HYDROCHLORIDE 10 MG/ML
INJECTION, SOLUTION EPIDURAL; INFILTRATION; INTRACAUDAL; PERINEURAL PRN
Status: DISCONTINUED | OUTPATIENT
Start: 2025-03-13 | End: 2025-03-13 | Stop reason: ALTCHOICE

## 2025-03-13 RX ORDER — FENTANYL CITRATE 50 UG/ML
INJECTION, SOLUTION INTRAMUSCULAR; INTRAVENOUS PRN
Status: DISCONTINUED | OUTPATIENT
Start: 2025-03-13 | End: 2025-03-13 | Stop reason: ALTCHOICE

## 2025-03-13 RX ORDER — IOPAMIDOL 612 MG/ML
INJECTION, SOLUTION INTRATHECAL PRN
Status: DISCONTINUED | OUTPATIENT
Start: 2025-03-13 | End: 2025-03-13 | Stop reason: ALTCHOICE

## 2025-03-13 RX ORDER — ACYCLOVIR 200 MG/1
CAPSULE ORAL PRN
Status: DISCONTINUED | OUTPATIENT
Start: 2025-03-13 | End: 2025-03-13 | Stop reason: ALTCHOICE

## 2025-03-13 RX ORDER — MIDAZOLAM HYDROCHLORIDE 1 MG/ML
INJECTION, SOLUTION INTRAMUSCULAR; INTRAVENOUS PRN
Status: DISCONTINUED | OUTPATIENT
Start: 2025-03-13 | End: 2025-03-13 | Stop reason: ALTCHOICE

## 2025-03-13 RX ORDER — DEXAMETHASONE SODIUM PHOSPHATE 4 MG/ML
INJECTION, SOLUTION INTRA-ARTICULAR; INTRALESIONAL; INTRAMUSCULAR; INTRAVENOUS; SOFT TISSUE PRN
Status: DISCONTINUED | OUTPATIENT
Start: 2025-03-13 | End: 2025-03-13 | Stop reason: ALTCHOICE

## 2025-03-13 ASSESSMENT — PAIN SCALES - GENERAL: PAINLEVEL_OUTOF10: 7

## 2025-03-13 ASSESSMENT — PAIN - FUNCTIONAL ASSESSMENT: PAIN_FUNCTIONAL_ASSESSMENT: NONE - DENIES PAIN

## 2025-03-13 NOTE — POST SEDATION
[facility]  Sedation/Analgesia Post Sedation Record    Pt Name: Estefania Gutiérrez  MRN: [id number]  YOB: 1970  Procedure Performed By: Sandro Vivas DO  Primary Care Physician: Keyla Santos, APRN - CNP    POST-PROCEDURE    Physicians/Assistants: Sandro Vivas DO  Procedure Performed: See Procedure Note   Sedation/Anesthesia: Versed and Fentanyl (See procedure note for amount and duration)  Estimated Blood Loss:     0  ml  Specimens Removed: None        Complications: None           Sandro Vivas DO  Electronically signed 3/13/2025 at 2:05 PM

## 2025-03-13 NOTE — PRE SEDATION
[facility]  Pre-Sedation/Analgesia History & Physical    Pt Name: Estefania Gutiérrez  MRN: [id number]  YOB: 1970  Provider Performing Procedure: Sandro Vivas DO   Primary Care Physician: Keyla Santos APRN - CNP      MEDICAL HISTORY       has a past medical history of Arthritis, GERD (gastroesophageal reflux disease), and Migraines.  SURGICAL HISTORY   has a past surgical history that includes  section; Hysterectomy (); cervical fusion (); [facility] US GUIDED BREAST BIOPSY W LOC DEVICE 1ST LESION RIGHT (Right, 10/21/2021); Salpingo-oophorectomy (N/A, 2022); Colonoscopy (2022); Back Injection (Bilateral, 2024); and Foot surgery (Right, 2024).    ALLERGIES   Allergies as of 2024 - Fully Reviewed 2024   Allergen Reaction Noted    Oxycodone-acetaminophen Nausea And Vomiting 2022       MEDICATIONS   Prior to Admission medications    Medication Sig Start Date End Date Taking? Authorizing Provider   furosemide (LASIX) 20 MG tablet Take 1 tablet by mouth as needed (daily PRN for lower leg edema) 24  Yes Keyla Santos APRN - CNP   potassium chloride (KLOR-CON M) 10 MEQ extended release tablet Take 1 tablet by mouth as needed (take daily PRN with lasix) 24  Yes Keyla Santos APRN - CNP   SUMAtriptan (IMITREX) 50 MG tablet Take 1 tablet by mouth daily as needed for Migraine 24  Yes Keyla Santos APRN - CNP   TROKENDI  MG CP24 TAKE 1 CAPSULE BY MOUTH EVERY DAY 24  Yes Keyla Santos APRN - CNP   fluticasone (FLONASE) 50 MCG/ACT nasal spray 1 spray by Each Nostril route daily 24  Yes Keyla Santos APRN - CNP   loratadine (CLARITIN) 10 MG tablet Take 1 tablet by mouth daily 24  Yes Keyla Santos APRN - CNP   modafinil (PROVIGIL) 100 MG tablet Take 1 tablet by mouth daily. Max Daily Amount: 100 mg 24 Yes Keyla Santos, APRN - CNP

## 2025-03-13 NOTE — PROGRESS NOTES
1120: Patient arrives to recovery room via cart.  Spontaneous respirations, vss, report received from surgical RN.  Patient denies pain, numbness, tingling , nausea.  Injection site clean, dry, intact. HOB elevated. IV capped. Snack and drink given.  Call light within reach.      1135: iv removed, ride called, patient sitting at the edge of bed getting dressed.      1145: patient ambulated to discharge lobby in stable condition. Patient discharged home with Michelle.  All personal belonging sent with patient.

## 2025-04-10 ENCOUNTER — HOSPITAL ENCOUNTER (OUTPATIENT)
Dept: WOMENS IMAGING | Age: 55
Discharge: HOME | End: 2025-04-10
Payer: COMMERCIAL

## 2025-04-10 ENCOUNTER — OFFICE VISIT (OUTPATIENT)
Dept: PHYSICAL MEDICINE AND REHAB | Age: 55
End: 2025-04-10
Payer: COMMERCIAL

## 2025-04-10 VITALS
BODY MASS INDEX: 28.73 KG/M2 | HEIGHT: 66 IN | DIASTOLIC BLOOD PRESSURE: 70 MMHG | SYSTOLIC BLOOD PRESSURE: 134 MMHG | WEIGHT: 178.79 LBS

## 2025-04-10 DIAGNOSIS — M25.562 CHRONIC PAIN OF BOTH KNEES: ICD-10-CM

## 2025-04-10 DIAGNOSIS — Z98.1 S/P CERVICAL SPINAL FUSION: ICD-10-CM

## 2025-04-10 DIAGNOSIS — M79.18 MYOFASCIAL PAIN: ICD-10-CM

## 2025-04-10 DIAGNOSIS — G89.29 CHRONIC PAIN OF BOTH KNEES: ICD-10-CM

## 2025-04-10 DIAGNOSIS — M53.3 SI (SACROILIAC) JOINT DYSFUNCTION: ICD-10-CM

## 2025-04-10 DIAGNOSIS — Z12.31 VISIT FOR SCREENING MAMMOGRAM: ICD-10-CM

## 2025-04-10 DIAGNOSIS — M47.816 LUMBAR SPONDYLOSIS: ICD-10-CM

## 2025-04-10 DIAGNOSIS — M53.3 SACROILIAC JOINT PAIN: ICD-10-CM

## 2025-04-10 DIAGNOSIS — M54.16 LUMBAR RADICULOPATHY: ICD-10-CM

## 2025-04-10 DIAGNOSIS — M79.2 NEUROPATHIC PAIN: ICD-10-CM

## 2025-04-10 DIAGNOSIS — M54.12 CERVICAL RADICULOPATHY: ICD-10-CM

## 2025-04-10 DIAGNOSIS — G89.4 CHRONIC PAIN SYNDROME: ICD-10-CM

## 2025-04-10 DIAGNOSIS — M47.812 FACET HYPERTROPHY OF CERVICAL REGION: ICD-10-CM

## 2025-04-10 DIAGNOSIS — M47.816 FACET HYPERTROPHY OF LUMBAR REGION: ICD-10-CM

## 2025-04-10 DIAGNOSIS — M47.812 CERVICAL SPONDYLOSIS: Primary | ICD-10-CM

## 2025-04-10 DIAGNOSIS — M54.12 CERVICAL RADICULITIS: ICD-10-CM

## 2025-04-10 DIAGNOSIS — M25.561 CHRONIC PAIN OF BOTH KNEES: ICD-10-CM

## 2025-04-10 PROCEDURE — 77063 BREAST TOMOSYNTHESIS BI: CPT

## 2025-04-10 PROCEDURE — 99214 OFFICE O/P EST MOD 30 MIN: CPT | Performed by: NURSE PRACTITIONER

## 2025-04-10 RX ORDER — CELECOXIB 200 MG/1
200 CAPSULE ORAL 2 TIMES DAILY
COMMUNITY
Start: 2025-02-24

## 2025-04-10 NOTE — PATIENT INSTRUCTIONS
be treated.  How long do medial branch block and neurotomy take?  It takes 20 to 30 minutes to get the block. You can go home after the doctor watches you for about an hour.  It takes 45 to 90 minutes to get a neurotomy, depending on how many nerves are heated. You will probably go home 30 to 60 minutes after the procedure.  What can you expect after a neurotomy?  You will get instructions on how to report how much pain you have when you are at home.  You may feel a little sore or tender at the injection site at first. But after a successful neurotomy, most people have pain relief right away. It often lasts for several months, but your pain may come back.  If your pain does come back, it may mean that the damaged nerve has healed and can send pain messages again. Or it can mean that a different nerve is causing pain. Your doctor will discuss your options with you.  Follow-up care is a key part of your treatment and safety. Be sure to make and go to all appointments, and call your doctor if you are having problems. It's also a good idea to know your test results and keep a list of the medicines you take.  Where can you learn more?  Go to [email or web address] and sign in to your [employer] account. Enter T494 in the Search Health Information box to learn more about \"Learning About Medial Branch Block and Neurotomy.\"     If you do not have an account, please click on the \"Sign Up Now\" link.  Current as of: April 8, 2021               Content Version: 13.1  © 2006-2021 [employer].   Care instructions adapted under license by [employer]. If you have questions about a medical condition or this instruction, always ask your healthcare professional. [employer] disclaims any warranty or liability for your use of this information.

## 2025-04-10 NOTE — PROGRESS NOTES
[facility] PROFESSIONAL SERVS  [facility] NEUROSCIENCE AND REHABILITATION [address]  [facility] [address]  Dept: [phone]  Dept Fax: [phone]  Loc: [phone]    Visit Date: 4/10/2025    Functionality Assessment/Goals Worksheet     On a scale of 0 (Does not Interfere) to 10 (Completely Interferes)     1.  Which number describes how during the past week pain has interfered with       the following:  A.  General Activity:  7  B.  Mood: 8  C.  Walking Ability:  7  D.  Normal Work (Includes both work outside the home and housework):  7  E.  Relations with Other People:   7  F.  Sleep:   7  G.  Enjoyment of Life:   7    2.  Patient Prefers to Take their Pain Medications:     []  On a regular basis   []  Only when necessary    []  Does not take pain medications    3.  What are the Patient's Goals/Expectations for Visiting Pain Management?     [x]  Learn about my pain    []  Receive Medication   []  Physical Therapy     []  Treat Depression   [x]  Receive Injections    []  Treat Sleep   []  Deal with Anxiety and Stress   []  Treat Opoid Dependence/Addiction   []  Other:  
4000 mg per day.       Adjuvants:   Patient is to start diclofenac 50 mg twice daily.  She is to not take any other additional NSAIDs, take only as ordered, and take with food or with milk.    Interventions:   In presence of cervical neck and with physical exam consistent for facetal pain, the option of medial branch blocks targeting bilateral cervical 4-5, cervical 5-6, under fluoroscopy, was chosen. The risks and benefits were discussed in detail with the patient. Patient wants to proceed with the injection with Dr Vivas    Procedure educations:  Discussed with patient about risks with procedure including infection, reaction to medication, increased pain, failure of procedures, worsening of symptoms, or bleeding.    Anticoagulation/NPO Recommendations:   Patient does need to hold any medications prior to the procedure. Diclofenac x 2 days  Patient will need to be NPO x 8 hours prior to the procedure.  IV Anesthesia    Multidisciplinary Pain Management:   In the presence of complex, chronic, and multi-factorial pain, the importance of a multidisciplinary approach to pain management in the patient’s management regimen was emphasized and discussed in great detail.   The patient was also advised to continue physical therapy and stretching exercises at home and cognitive behavioral and/or group therapy.     Prescription:  Diclofenac 50mg BID     Follow-up:   One week after injection    It was my pleasure to evaluate Estefanai Gutiérrez today.  I spent over 35 minutes evaluating this patient, reviewing previous notes and images and completing documentation.       SOM Rogel - CNP   Interventional Pain Management/PM&R   [facility] Neuroscience and Rehabilitation [place]

## 2025-05-02 NOTE — DISCHARGE INSTRUCTIONS
Post procedure Instructions:    No driving or making significant decisions for the remainder of the day.   Be cautions with walking and activity for 24 hours, do not over exert yourself.  Ok to resume pre-procedure activity level today.  Apply ice to site of injection site if you have pain or discomfort.  Do not submerge sit of injection during bath or pool activities for 48 hours post-procedure.  Resume blood thinning medications in 24 hours.     Call office [phone] if you have:  Temperature greater than 100.4  Persistent nausea and vomiting  Severe uncontrolled pain  Redness, tenderness, or signs of infection (pain, swelling, redness, odor or green/yellow discharge around the site)  Difficulty breathing, headache or visual disturbances  Hives  Persistent dizziness or light-headedness  Extreme fatigue  Any other questions or concerns you may have after discharge    In an emergency, call 911 or go to an Emergency Department at a nearby hospital    “Surgical Site Infections”      How can we work together to prevent Surgical Site Infections?   We would like to thank you for choosing [facility] for your Surgical Care.  Below you will find helpful information on how we can work together to prevent Surgical Site Infections.    What is a Surgical Site Infection (SSI)?  A surgical site infection is an infection that occurs after surgery in the part of the body where the surgery took place. Most patients who have surgery do not develop an infection. However, infections develop in about 1 to 3 out of every 100 patients who have surgery.  Some of the common symptoms of a surgical site infection are:  Redness and pain around the area where you had surgery  Drainage of cloudy fluid from your surgical wound  Fever    Can SSIs be treated?  Yes. Most surgical site infections can be treated with antibiotics. The antibiotic given to you depends on the bacteria (germs) causing the infection. Sometimes patients with

## 2025-05-05 NOTE — H&P
We discussed that if podiatry does decide to pursue surgery, she is to let us know, so she can discuss with them and us if we are able to do procedures based off of what they are doing.  She voiced agreement understanding.  I would like to see her back 4 weeks after this injection is completed.    As she has had significant benefit in reduction of her radicular symptoms following epidural, we did discuss expectations, length of results, we can repeat this if or when pain returns based off of steroid use and monitoring how often we are using this together.  As she continues to have some facet mediated pain, we did discuss alternate injections to help assist with her pain as well.  She is agreeable with this.  I set her up for a medial branch block targeting bilateral C4-5, C5-6 with Dr. Vivas under fluoroscopy, with plans for ablation in the future.  I have encouraged her to continue working with her diclofenac 50 mg twice daily-3 times daily, as well as over-the-counter's, ice, heat, topicals.  She is also encouraged to continue working with her home exercises, stretches.  I would like to see her back 1 week after these injections are completed to reevaluate.      Plan:   The following treatment recommendations and plan were discussed in detail with Estefania Gutiérrez.    Imaging:   I have reviewed patient’s imaging of lumbar XR, thoracic XR, lumbar MRI, cervical MRI and results were discussed with patient today.     Analgesics:   The patient is currently managing their pain with the use of modafinil which is prescribed by alternate provider.    We recommend that the patient follow the recommendation of the prescribing provider with regard to the above medication(s) and contact their prescribing provider for refills if needed.   The side effect profile of long-term opioid use was discussed and recommended emphasis on multimodal strategies for pain management.     Patient is taking Acetaminophen. Patient informed that

## 2025-05-06 ENCOUNTER — HOSPITAL ENCOUNTER (OUTPATIENT)
Age: 55
Setting detail: OUTPATIENT SURGERY
Discharge: HOME | End: 2025-05-06
Attending: ANESTHESIOLOGY | Admitting: ANESTHESIOLOGY
Payer: COMMERCIAL

## 2025-05-06 ENCOUNTER — APPOINTMENT (OUTPATIENT)
Dept: GENERAL RADIOLOGY | Age: 55
End: 2025-05-06
Attending: ANESTHESIOLOGY
Payer: COMMERCIAL

## 2025-05-06 VITALS
SYSTOLIC BLOOD PRESSURE: 100 MMHG | OXYGEN SATURATION: 96 % | DIASTOLIC BLOOD PRESSURE: 62 MMHG | HEIGHT: 65 IN | WEIGHT: 179.2 LBS | BODY MASS INDEX: 29.85 KG/M2 | RESPIRATION RATE: 16 BRPM | TEMPERATURE: 96.9 F | HEART RATE: 78 BPM

## 2025-05-06 PROCEDURE — 7100000010 HC PHASE II RECOVERY - FIRST 15 MIN: Performed by: ANESTHESIOLOGY

## 2025-05-06 PROCEDURE — 2709999900 HC NON-CHARGEABLE SUPPLY: Performed by: ANESTHESIOLOGY

## 2025-05-06 PROCEDURE — 6360000002 HC RX W HCPCS: Performed by: ANESTHESIOLOGY

## 2025-05-06 PROCEDURE — 7100000011 HC PHASE II RECOVERY - ADDTL 15 MIN: Performed by: ANESTHESIOLOGY

## 2025-05-06 PROCEDURE — 99152 MOD SED SAME PHYS/QHP 5/>YRS: CPT | Performed by: ANESTHESIOLOGY

## 2025-05-06 PROCEDURE — 3600000056 HC PAIN LEVEL 4 BASE: Performed by: ANESTHESIOLOGY

## 2025-05-06 PROCEDURE — 64490 INJ PARAVERT F JNT C/T 1 LEV: CPT | Performed by: ANESTHESIOLOGY

## 2025-05-06 PROCEDURE — 64491 INJ PARAVERT F JNT C/T 2 LEV: CPT | Performed by: ANESTHESIOLOGY

## 2025-05-06 RX ORDER — LIDOCAINE HYDROCHLORIDE 10 MG/ML
INJECTION, SOLUTION EPIDURAL; INFILTRATION; INTRACAUDAL; PERINEURAL PRN
Status: DISCONTINUED | OUTPATIENT
Start: 2025-05-06 | End: 2025-05-06 | Stop reason: ALTCHOICE

## 2025-05-06 RX ORDER — MIDAZOLAM HYDROCHLORIDE 1 MG/ML
INJECTION, SOLUTION INTRAMUSCULAR; INTRAVENOUS PRN
Status: DISCONTINUED | OUTPATIENT
Start: 2025-05-06 | End: 2025-05-06 | Stop reason: ALTCHOICE

## 2025-05-06 RX ORDER — BUPIVACAINE HYDROCHLORIDE 5 MG/ML
INJECTION, SOLUTION PERINEURAL PRN
Status: DISCONTINUED | OUTPATIENT
Start: 2025-05-06 | End: 2025-05-06 | Stop reason: ALTCHOICE

## 2025-05-06 RX ORDER — FENTANYL CITRATE 50 UG/ML
INJECTION, SOLUTION INTRAMUSCULAR; INTRAVENOUS PRN
Status: DISCONTINUED | OUTPATIENT
Start: 2025-05-06 | End: 2025-05-06 | Stop reason: ALTCHOICE

## 2025-05-06 ASSESSMENT — PAIN - FUNCTIONAL ASSESSMENT
PAIN_FUNCTIONAL_ASSESSMENT: 0-10
PAIN_FUNCTIONAL_ASSESSMENT: 0-10

## 2025-05-06 ASSESSMENT — PAIN SCALES - GENERAL: PAINLEVEL_OUTOF10: 7

## 2025-05-06 ASSESSMENT — PAIN DESCRIPTION - LOCATION: LOCATION: NECK

## 2025-05-06 NOTE — PROCEDURES
Pre-operative Diagnosis: Cervical spondylosis     Post-operative Diagnosis: Cervical spondylosis     Procedure: Cervical medial branch blocks targeting bilateral C4/C5 and C5/C6     Procedure Description:  After having obtained a signed informed consent, the patient was taken to the fluoroscopy suite and placed in the prone position.  The neck was prepped with chloraprep and draped in a sterile fashion. Then, 0.5 cc of  1 % lidocaine was used to anesthetize the skin and underlying subcutaneous superficial tissues.  Under fluoroscopic guidance, 22G 3.5 inch spinal needles were advanced on to the mid facet pilar of C4, C5, and C6 on the RIGHT.  There were no paresthesias, heme, or CSF aspiration.  Needle placement was confirmed using the AP and lateral views. Then, 0.5 cc 0.5% bupivacaine was injected. The needles were removed without any complication. The procedure was repeated on the contralateral side.  The patient tolerated the procedure well and was transported to the recovery room where he was observed for 15 minutes to then be discharged in ambulatory fashion.      Procedural Complications: None  Estimated Blood Loss: 0 mL      IV sedation was used during the procedure:  - Moderate intravenous conscious sedation was supervised by Dr. Vivas  - The patient was independently monitored by a Registered Nurse assigned to the procedure room  - Monitoring included automated blood pressure, continuous EKG, and continuous pulse oximetry  - The detailed conscious record is permanently stored in the Hospital Information System  - The following is the conscious sedation record:  Start Time: 8:40  End Time : 8:55  Duration: 15 minutes   Medications Administered: 2 mg Versed, 100 mcg Fentanyl      Sandro Vivas DO  Interventional Pain Management/PM&R   [facility]

## 2025-05-06 NOTE — PRE SEDATION
[facility]  Pre-Sedation/Analgesia History & Physical    Pt Name: Estefania Gutiérrez  MRN: [id number]  YOB: 1970  Provider Performing Procedure: Sandro Vivas DO   Primary Care Physician: Keyla Santos APRN - CNP      MEDICAL HISTORY       has a past medical history of Arthritis, GERD (gastroesophageal reflux disease), and Migraines.  SURGICAL HISTORY   has a past surgical history that includes  section; Hysterectomy (); cervical fusion (); TRA US GUIDED BREAST BIOPSY W LOC DEVICE 1ST LESION RIGHT (Right, 10/21/2021); Salpingo-oophorectomy (N/A, 2022); Colonoscopy (2022); Back Injection (Bilateral, 2024); Foot surgery (Right, 2024); and Pain management procedure (N/A, 2025).    ALLERGIES   Allergies as of 04/10/2025 - Fully Reviewed 04/10/2025   Allergen Reaction Noted    Oxycodone-acetaminophen Nausea And Vomiting 2022       MEDICATIONS   Prior to Admission medications    Medication Sig Start Date End Date Taking? Authorizing Provider   venlafaxine (EFFEXOR XR) 150 MG extended release capsule Take 1 capsule by mouth daily 25  Yes Keyla Santos APRN - CNP   furosemide (LASIX) 20 MG tablet Take 1 tablet by mouth as needed (daily PRN for lower leg edema) 24  Yes Keyla Santos APRN - CNP   potassium chloride (KLOR-CON M) 10 MEQ extended release tablet Take 1 tablet by mouth as needed (take daily PRN with lasix) 24  Yes Keyla Santos APRN - CNP   SUMAtriptan (IMITREX) 50 MG tablet Take 1 tablet by mouth daily as needed for Migraine 24  Yes Keyla Santos APRN - CNP   TROKENDI  MG CP24 TAKE 1 CAPSULE BY MOUTH EVERY DAY 24  Yes Keyla Santos APRN - CNP   fluticasone (FLONASE) 50 MCG/ACT nasal spray 1 spray by Each Nostril route daily 24  Yes Keyla Santos APRN - CNP   loratadine (CLARITIN) 10 MG tablet Take 1 tablet by mouth daily 24  Yes Danielle

## 2025-05-06 NOTE — POST SEDATION
[facility]  Sedation/Analgesia Post Sedation Record    Pt Name: Estefania Gutiérrez  MRN: [id number]  YOB: 1970  Procedure Performed By: Sandro Vivas DO  Primary Care Physician: Keyla Santos, APRN - CNP    POST-PROCEDURE    Physicians/Assistants: Sandro Vivas DO  Procedure Performed: See Procedure Note   Sedation/Anesthesia: Versed and Fentanyl (See procedure note for amount and duration)  Estimated Blood Loss:     0  ml  Specimens Removed: None        Complications: None           Sandro Vivas DO  Electronically signed 5/6/2025 at 10:43 AM

## 2025-05-06 NOTE — PROGRESS NOTES
0846 - Patient arrived to Phase II via bed, report from Tea WARNER. Patient awake and alert without complaints. VSS, site WNL. Denies any numbness or tingling. Drink and snack provided. Call light in reach.    0925 - Patient sat edge of bed, tolerated well. IV removed. Patient dressed.    0929 - Patient discharged ambulatory to private vehicle with sister.

## 2025-05-13 ENCOUNTER — OFFICE VISIT (OUTPATIENT)
Dept: PHYSICAL MEDICINE AND REHAB | Age: 55
End: 2025-05-13
Payer: COMMERCIAL

## 2025-05-13 VITALS
DIASTOLIC BLOOD PRESSURE: 64 MMHG | HEIGHT: 65 IN | SYSTOLIC BLOOD PRESSURE: 102 MMHG | WEIGHT: 179.23 LBS | BODY MASS INDEX: 29.86 KG/M2

## 2025-05-13 DIAGNOSIS — G89.29 CHRONIC PAIN OF BOTH KNEES: ICD-10-CM

## 2025-05-13 DIAGNOSIS — M54.12 CERVICAL RADICULITIS: ICD-10-CM

## 2025-05-13 DIAGNOSIS — M79.18 MYOFASCIAL PAIN: ICD-10-CM

## 2025-05-13 DIAGNOSIS — G89.4 CHRONIC PAIN SYNDROME: ICD-10-CM

## 2025-05-13 DIAGNOSIS — M54.12 CERVICAL RADICULOPATHY: ICD-10-CM

## 2025-05-13 DIAGNOSIS — M47.812 FACET HYPERTROPHY OF CERVICAL REGION: ICD-10-CM

## 2025-05-13 DIAGNOSIS — M53.3 SACROILIAC JOINT PAIN: ICD-10-CM

## 2025-05-13 DIAGNOSIS — M47.812 CERVICAL SPONDYLOSIS: Primary | ICD-10-CM

## 2025-05-13 DIAGNOSIS — M53.3 SI (SACROILIAC) JOINT DYSFUNCTION: ICD-10-CM

## 2025-05-13 DIAGNOSIS — Z98.1 S/P CERVICAL SPINAL FUSION: ICD-10-CM

## 2025-05-13 DIAGNOSIS — M25.562 CHRONIC PAIN OF BOTH KNEES: ICD-10-CM

## 2025-05-13 DIAGNOSIS — M47.816 LUMBAR SPONDYLOSIS: ICD-10-CM

## 2025-05-13 DIAGNOSIS — M47.816 FACET HYPERTROPHY OF LUMBAR REGION: ICD-10-CM

## 2025-05-13 DIAGNOSIS — M79.2 NEUROPATHIC PAIN: ICD-10-CM

## 2025-05-13 DIAGNOSIS — M54.16 LUMBAR RADICULOPATHY: ICD-10-CM

## 2025-05-13 DIAGNOSIS — M25.561 CHRONIC PAIN OF BOTH KNEES: ICD-10-CM

## 2025-05-13 PROCEDURE — 99214 OFFICE O/P EST MOD 30 MIN: CPT | Performed by: NURSE PRACTITIONER

## 2025-05-13 NOTE — PROGRESS NOTES
Functionality Assessment/Goals Worksheet     On a scale of 0 (Does not Interfere) to 10 (Completely Interferes)     1.  Which number describes how during the past week pain has interfered with           the following:  A.  General Activity:  8  B.  Mood: 8  C.  Walking Ability:  7  D.  Normal Work (Includes both work outside the home and housework):  10  E.  Relations with Other People:   6  F.  Sleep:   8  G.  Enjoyment of Life:   10    2.  Patient Prefers to Take their Pain Medications:     []  On a regular basis   []  Only when necessary    [x]  Does not take pain medications    3.  What are the Patient's Goals/Expectations for Visiting Pain Management?     [x]  Learn about my pain    []  Receive Medication   []  Physical Therapy     []  Treat Depression   [x]  Receive Injections    []  Treat Sleep   []  Deal with Anxiety and Stress   []  Treat Opoid Dependence/Addiction   []  Other:                                
be 4000 mg per day.       Adjuvants:   Patient is to start diclofenac 50 mg 2-3 times daily.  She is to not take any other additional NSAIDs, take only as ordered, and take with food or with milk.    Interventions:   In presence of cervical neck and with physical exam consistent for facetal pain, the option of confirmatory medial branch blocks targeting bilateral cervical 4-5, cervical 5-6, under fluoroscopy, was chosen. The risks and benefits were discussed in detail with the patient. Patient wants to proceed with the injection with Dr Vivas    Procedure educations:  Discussed with patient about risks with procedure including infection, reaction to medication, increased pain, failure of procedures, worsening of symptoms, or bleeding.    Anticoagulation/NPO Recommendations:   Patient does need to hold any medications prior to the procedure. Diclofenac x 2 days  Patient will need to be NPO x 8 hours prior to the procedure.  IV Anesthesia    Multidisciplinary Pain Management:   In the presence of complex, chronic, and multi-factorial pain, the importance of a multidisciplinary approach to pain management in the patient’s management regimen was emphasized and discussed in great detail.   The patient was also advised to continue physical therapy and stretching exercises at home and cognitive behavioral and/or group therapy.     Prescription:  To call for refills    Follow-up:   One week after injection    It was my pleasure to evaluate Estefania Gutiérrez today.  I spent over 35 minutes evaluating this patient, reviewing previous notes and images and completing documentation.       SOM Rogel - CNP   Interventional Pain Management/PM&R   [facility] [place]

## 2025-05-19 ENCOUNTER — TELEPHONE (OUTPATIENT)
Dept: PHYSICAL MEDICINE AND REHAB | Age: 55
End: 2025-05-19

## 2025-05-19 NOTE — H&P
amount of acetaminophen taken on a regular basis should only be 4000 mg per day.       Adjuvants:   Patient is to start diclofenac 50 mg 2-3 times daily.  She is to not take any other additional NSAIDs, take only as ordered, and take with food or with milk.    Interventions:   In presence of cervical neck and with physical exam consistent for facetal pain, the option of confirmatory medial branch blocks targeting bilateral cervical 4-5, cervical 5-6, under fluoroscopy, was chosen. The risks and benefits were discussed in detail with the patient. Patient wants to proceed with the injection with Dr Vivas    Procedure educations:  Discussed with patient about risks with procedure including infection, reaction to medication, increased pain, failure of procedures, worsening of symptoms, or bleeding.    Anticoagulation/NPO Recommendations:   Patient does need to hold any medications prior to the procedure. Diclofenac x 2 days  Patient will need to be NPO x 8 hours prior to the procedure.  IV Anesthesia    Multidisciplinary Pain Management:   In the presence of complex, chronic, and multi-factorial pain, the importance of a multidisciplinary approach to pain management in the patient’s management regimen was emphasized and discussed in great detail.   The patient was also advised to continue physical therapy and stretching exercises at home and cognitive behavioral and/or group therapy.     Prescription:  To call for refills    Follow-up:   One week after injection    It was my pleasure to evaluate Estefania ARSH Gutiérrez today.  I spent over 35 minutes evaluating this patient, reviewing previous notes and images and completing documentation.       Sandro Vivas, DO   Interventional Pain Management/PM&R   [facility] [place]

## 2025-05-20 ENCOUNTER — APPOINTMENT (OUTPATIENT)
Dept: GENERAL RADIOLOGY | Age: 55
End: 2025-05-20
Attending: ANESTHESIOLOGY
Payer: COMMERCIAL

## 2025-05-20 ENCOUNTER — HOSPITAL ENCOUNTER (OUTPATIENT)
Age: 55
Setting detail: OUTPATIENT SURGERY
Discharge: HOME | End: 2025-05-20
Attending: ANESTHESIOLOGY | Admitting: ANESTHESIOLOGY
Payer: COMMERCIAL

## 2025-05-20 VITALS
DIASTOLIC BLOOD PRESSURE: 57 MMHG | RESPIRATION RATE: 16 BRPM | OXYGEN SATURATION: 93 % | HEART RATE: 83 BPM | WEIGHT: 180 LBS | SYSTOLIC BLOOD PRESSURE: 107 MMHG | HEIGHT: 65 IN | TEMPERATURE: 97.3 F | BODY MASS INDEX: 29.99 KG/M2

## 2025-05-20 PROCEDURE — 3600000056 HC PAIN LEVEL 4 BASE: Performed by: ANESTHESIOLOGY

## 2025-05-20 PROCEDURE — 99152 MOD SED SAME PHYS/QHP 5/>YRS: CPT | Performed by: ANESTHESIOLOGY

## 2025-05-20 PROCEDURE — 64491 INJ PARAVERT F JNT C/T 2 LEV: CPT | Performed by: ANESTHESIOLOGY

## 2025-05-20 PROCEDURE — 64490 INJ PARAVERT F JNT C/T 1 LEV: CPT | Performed by: ANESTHESIOLOGY

## 2025-05-20 PROCEDURE — 6360000002 HC RX W HCPCS: Performed by: ANESTHESIOLOGY

## 2025-05-20 PROCEDURE — 7100000011 HC PHASE II RECOVERY - ADDTL 15 MIN: Performed by: ANESTHESIOLOGY

## 2025-05-20 PROCEDURE — 7100000010 HC PHASE II RECOVERY - FIRST 15 MIN: Performed by: ANESTHESIOLOGY

## 2025-05-20 PROCEDURE — 2709999900 HC NON-CHARGEABLE SUPPLY: Performed by: ANESTHESIOLOGY

## 2025-05-20 RX ORDER — FENTANYL CITRATE 50 UG/ML
INJECTION, SOLUTION INTRAMUSCULAR; INTRAVENOUS PRN
Status: DISCONTINUED | OUTPATIENT
Start: 2025-05-20 | End: 2025-05-20 | Stop reason: ALTCHOICE

## 2025-05-20 RX ORDER — BUPIVACAINE HYDROCHLORIDE 5 MG/ML
INJECTION, SOLUTION PERINEURAL PRN
Status: DISCONTINUED | OUTPATIENT
Start: 2025-05-20 | End: 2025-05-20 | Stop reason: ALTCHOICE

## 2025-05-20 RX ORDER — LIDOCAINE HYDROCHLORIDE 10 MG/ML
INJECTION, SOLUTION EPIDURAL; INFILTRATION; INTRACAUDAL; PERINEURAL PRN
Status: DISCONTINUED | OUTPATIENT
Start: 2025-05-20 | End: 2025-05-20 | Stop reason: ALTCHOICE

## 2025-05-20 RX ORDER — MIDAZOLAM HYDROCHLORIDE 1 MG/ML
INJECTION, SOLUTION INTRAMUSCULAR; INTRAVENOUS PRN
Status: DISCONTINUED | OUTPATIENT
Start: 2025-05-20 | End: 2025-05-20 | Stop reason: ALTCHOICE

## 2025-05-20 ASSESSMENT — PAIN - FUNCTIONAL ASSESSMENT
PAIN_FUNCTIONAL_ASSESSMENT: NONE - DENIES PAIN
PAIN_FUNCTIONAL_ASSESSMENT: 0-10

## 2025-05-20 ASSESSMENT — PAIN SCALES - GENERAL: PAINLEVEL_OUTOF10: 8

## 2025-05-20 ASSESSMENT — PAIN DESCRIPTION - DESCRIPTORS: DESCRIPTORS: ACHING

## 2025-05-20 NOTE — PRE SEDATION
[facility]  Pre-Sedation/Analgesia History & Physical    Pt Name: Estefania Gutiérrez  MRN: [id number]  YOB: 1970  Provider Performing Procedure: Sandro Vivas DO   Primary Care Physician: Keyla Santos APRN - CNP      MEDICAL HISTORY       has a past medical history of Arthritis, GERD (gastroesophageal reflux disease), and Migraines.  SURGICAL HISTORY   has a past surgical history that includes  section; Hysterectomy (); cervical fusion (); TRA US GUIDED BREAST BIOPSY W LOC DEVICE 1ST LESION RIGHT (Right, 10/21/2021); Salpingo-oophorectomy (N/A, 2022); Colonoscopy (2022); Back Injection (Bilateral, 2024); Foot surgery (Right, 2024); Pain management procedure (N/A, 2025); and Facet joint injection (Bilateral, 2025).    ALLERGIES   Allergies as of 2025 - Fully Reviewed 2025   Allergen Reaction Noted    Oxycodone-acetaminophen Nausea And Vomiting 2022       MEDICATIONS   Prior to Admission medications    Medication Sig Start Date End Date Taking? Authorizing Provider   venlafaxine (EFFEXOR XR) 150 MG extended release capsule Take 1 capsule by mouth daily 25  Yes Keyla Santos APRN - CNP   furosemide (LASIX) 20 MG tablet Take 1 tablet by mouth as needed (daily PRN for lower leg edema) 24  Yes Keyla Santos APRN - CNP   potassium chloride (KLOR-CON M) 10 MEQ extended release tablet Take 1 tablet by mouth as needed (take daily PRN with lasix) 24  Yes Keyla Santos APRN - CNP   SUMAtriptan (IMITREX) 50 MG tablet Take 1 tablet by mouth daily as needed for Migraine 24  Yes Keyla Santos APRN - CNP   TROKENDI  MG CP24 TAKE 1 CAPSULE BY MOUTH EVERY DAY 24  Yes Keyla Santos APRN - CNP   loratadine (CLARITIN) 10 MG tablet Take 1 tablet by mouth daily 24  Yes Langhals, Keyla Ria, APRN - CNP   modafinil (PROVIGIL) 100 MG tablet Take 1 tablet by mouth

## 2025-05-20 NOTE — PROCEDURES
Pre-operative Diagnosis: Cervical spondylosis     Post-operative Diagnosis: Cervical spondylosis     Procedure: Cervical medial branch blocks targeting bilateral C4/C5 and C5/C6     Procedure Description:  After having obtained a signed informed consent, the patient was taken to the fluoroscopy suite and placed in the prone position.  The neck was prepped with chloraprep and draped in a sterile fashion. Then, 0.5 cc of  1 % lidocaine was used to anesthetize the skin and underlying subcutaneous superficial tissues.  Under fluoroscopic guidance, 22G 3.5 inch spinal needles were advanced on to the mid facet pilar of C4, C5, and C6 on the RIGHT.  There were no paresthesias, heme, or CSF aspiration.  Needle placement was confirmed using the AP and lateral views. Then, 0.5 cc 0.5% bupivacaine was injected. The needles were removed without any complication. The procedure was repeated on the contralateral side.  The patient tolerated the procedure well and was transported to the recovery room where he was observed for 15 minutes to then be discharged in ambulatory fashion.      Procedural Complications: None  Estimated Blood Loss: 0 mL        IV sedation was used during the procedure:  - Moderate intravenous conscious sedation was supervised by Dr. Vivas  - The patient was independently monitored by a Registered Nurse assigned to the procedure room  - Monitoring included automated blood pressure, continuous EKG, and continuous pulse oximetry  - The detailed conscious record is permanently stored in the Hospital Information System  - The following is the conscious sedation record:  Start Time: 14:28  End Time : 14:43  Duration: 15 minutes   Medications Administered: 2 mg Versed, 100 mcg Fentanyl        Sandro Vivas DO  Interventional Pain Management/PM&R   [facility] and [facility]

## 2025-05-20 NOTE — POST SEDATION
[facility]  Sedation/Analgesia Post Sedation Record    Pt Name: Estefania Gutiérrez  MRN: [id number]  YOB: 1970  Procedure Performed By: Sandro Vivas DO  Primary Care Physician: Keyla Santos, APRN - CNP    POST-PROCEDURE    Physicians/Assistants: Sandro Vivas DO  Procedure Performed: See Procedure Note   Sedation/Anesthesia: Versed and Fentanyl (See procedure note for amount and duration)  Estimated Blood Loss:     0  ml  Specimens Removed: None        Complications: None           Sandro Vivas DO  Electronically signed 5/20/2025 at 7:38 PM

## 2025-05-20 NOTE — PROGRESS NOTES
1434 Patient arrives to recovery room via cart.  Spontaneous respirations, vss, report received from surgical RN.  Patient denies pain, numbness, tingling , nausea.  Injection site clean, dry, intact. HOB elevated. IV capped. Snack and drink given.  Call light within reach.    1500 IV discontinued. Up to dress self  1507 Discharge to home in stable ambulatory condition with sister

## 2025-05-27 ENCOUNTER — OFFICE VISIT (OUTPATIENT)
Dept: PHYSICAL MEDICINE AND REHAB | Age: 55
End: 2025-05-27
Payer: COMMERCIAL

## 2025-05-27 VITALS
BODY MASS INDEX: 29.97 KG/M2 | WEIGHT: 179.9 LBS | SYSTOLIC BLOOD PRESSURE: 126 MMHG | DIASTOLIC BLOOD PRESSURE: 70 MMHG | HEIGHT: 65 IN

## 2025-05-27 DIAGNOSIS — M47.812 FACET HYPERTROPHY OF CERVICAL REGION: ICD-10-CM

## 2025-05-27 DIAGNOSIS — M47.812 CERVICAL SPONDYLOSIS: Primary | ICD-10-CM

## 2025-05-27 DIAGNOSIS — G89.4 CHRONIC PAIN SYNDROME: ICD-10-CM

## 2025-05-27 DIAGNOSIS — Z98.1 S/P CERVICAL SPINAL FUSION: ICD-10-CM

## 2025-05-27 DIAGNOSIS — M54.16 LUMBAR RADICULOPATHY: ICD-10-CM

## 2025-05-27 DIAGNOSIS — M53.3 SACROILIAC JOINT PAIN: ICD-10-CM

## 2025-05-27 DIAGNOSIS — M79.2 NEUROPATHIC PAIN: ICD-10-CM

## 2025-05-27 DIAGNOSIS — M54.12 CERVICAL RADICULOPATHY: ICD-10-CM

## 2025-05-27 DIAGNOSIS — M79.18 MYOFASCIAL PAIN: ICD-10-CM

## 2025-05-27 DIAGNOSIS — M47.816 LUMBAR SPONDYLOSIS: ICD-10-CM

## 2025-05-27 DIAGNOSIS — M53.3 SI (SACROILIAC) JOINT DYSFUNCTION: ICD-10-CM

## 2025-05-27 DIAGNOSIS — M47.816 FACET HYPERTROPHY OF LUMBAR REGION: ICD-10-CM

## 2025-05-27 DIAGNOSIS — M54.12 CERVICAL RADICULITIS: ICD-10-CM

## 2025-05-27 PROCEDURE — 99214 OFFICE O/P EST MOD 30 MIN: CPT | Performed by: NURSE PRACTITIONER

## 2025-05-27 NOTE — PROGRESS NOTES
Chronic Pain/PM&R Clinic Note     Encounter Date: 5/27/25    Subjective:   Chief Complaint:   Chief Complaint   Patient presents with    Follow Up After Procedure     Procedure follow up        History of Present Illness:   Estefania Gutiérrez is a 54 y.o. female seen in the clinic initially on 9/13/2023 upon request from Dr Jimenez for her history of neck and low back pain. She has a personal medical history of cervical fusion, arthritis, migraines.     Patient presents with complaints of pain in her bilateral neck, that radiates to the tops of her shoulder, and down her right arm to her fingertips. She reports she has occasional left arm pain to her fingertipes as well. She reports pain has been occurring for years and years. She denies any accident or injury that caused her pain. She states she had a antiror fusion in 2019, and it did not help her pain at all. She states she actually feels like it made it worse. She describes her neck pain as sharp pain occasionally, with constant aching and nagging  pain. She states she gets numbness, tingling down her arms. She does report she has been dropping things at work and feels like she has decreased sensation in her fingertips. She states that pain is worse with lifting, raising her arms, house work, showering, using the bathroom, work. 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 She states pain is somewhat better with certain positions, heat, but nothing lasts. She states she is not sleeping much due to pain, and that she does get migraines from her neck pain. She does note that pain is worse in the morning, but can be just as bad throughout the day. She reports that she did physical therapy after her surgery, but none since. She reports that she tries to be active, but does not do a specific program.     She is also complaining of bilateral low back pain that goes into her buttocks and down her posterior thighs to her knees. She

## 2025-05-27 NOTE — PROGRESS NOTES
[facility] PROFESSIONAL SERVS  [facility] NEUROSCIENCE AND REHABILITATION [address]  [facility] [address]  Dept: [phone]  Dept Fax: [phone]  Loc: [phone]    Visit Date: 5/27/2025    Functionality Assessment/Goals Worksheet     On a scale of 0 (Does not Interfere) to 10 (Completely Interferes)     1.  Which number describes how during the past week pain has interfered with       the following:  A.  General Activity:  0  B.  Mood: 0  C.  Walking Ability:  6  D.  Normal Work (Includes both work outside the home and housework):  9  E.  Relations with Other People:   6  F.  Sleep:   8  G.  Enjoyment of Life:   8    2.  Patient Prefers to Take their Pain Medications:     []  On a regular basis   []  Only when necessary    [x]  Does not take pain medications    3.  What are the Patient's Goals/Expectations for Visiting Pain Management?     []  Learn about my pain    []  Receive Medication   []  Physical Therapy     []  Treat Depression   [x]  Receive Injections    []  Treat Sleep   []  Deal with Anxiety and Stress   []  Treat Opoid Dependence/Addiction   []  Other:

## 2025-06-19 ENCOUNTER — HOSPITAL ENCOUNTER (OUTPATIENT)
Dept: GENERAL RADIOLOGY | Age: 55
Discharge: HOME | End: 2025-06-19
Payer: COMMERCIAL

## 2025-06-19 ENCOUNTER — HOSPITAL ENCOUNTER (OUTPATIENT)
Age: 55
Discharge: HOME | End: 2025-06-19
Payer: COMMERCIAL

## 2025-06-19 DIAGNOSIS — K59.00 CONSTIPATION, UNSPECIFIED CONSTIPATION TYPE: ICD-10-CM

## 2025-06-19 DIAGNOSIS — Z98.890 HISTORY OF ABDOMINAL SURGERY: ICD-10-CM

## 2025-06-19 PROCEDURE — 74018 RADEX ABDOMEN 1 VIEW: CPT

## 2025-06-24 NOTE — DISCHARGE INSTRUCTIONS
Post procedure Instructions:  No driving or making significant decisions for the remainder of the day.   Be cautions with walking and activity for 24 hours, do not over exert yourself.  Ok to resume pre-procedure activity level today.  Apply ice to site of injection site if you have pain or discomfort.  Do not submerge sit of injection during bath or pool activities for 48 hours post-procedure.  Resume blood thinning medications in 24 hours.     Call office [phone] if you have:  Temperature greater than 100.4  Persistent nausea and vomiting  Severe uncontrolled pain  Redness, tenderness, or signs of infection (pain, swelling, redness, odor or green/yellow discharge around the site)  Difficulty breathing, headache or visual disturbances  Hives  Persistent dizziness or light-headedness  Extreme fatigue  Any other questions or concerns you may have after discharge    In an emergency, call 911 or go to an Emergency Department at a nearby hospital    “Surgical Site Infections”      How can we work together to prevent Surgical Site Infections?   We would like to thank you for choosing [facility] for your Surgical Care.  Below you will find helpful information on how we can work together to prevent Surgical Site Infections.    What is a Surgical Site Infection (SSI)?  A surgical site infection is an infection that occurs after surgery in the part of the body where the surgery took place. Most patients who have surgery do not develop an infection. However, infections develop in about 1 to 3 out of every 100 patients who have surgery.  Some of the common symptoms of a surgical site infection are:  Redness and pain around the area where you had surgery  Drainage of cloudy fluid from your surgical wound  Fever    Can SSIs be treated?  Yes. Most surgical site infections can be treated with antibiotics. The antibiotic given to you depends on the bacteria (germs) causing the infection. Sometimes patients with

## 2025-06-25 NOTE — H&P
to understanding post procedure directions / restrictions. All other questions and concerns addressed before patient discharge in ambulatory fashion.       Chronic Pain/PM&R Clinic Note     Encounter Date: 5/27/25    Subjective:   Chief Complaint:   No chief complaint on file.      History of Present Illness:   Estefania Gutiérrez is a 54 y.o. female seen in the clinic initially on 9/13/2023 upon request from Dr Jimenez for her history of neck and low back pain. She has a personal medical history of cervical fusion, arthritis, migraines.     Patient presents with complaints of pain in her bilateral neck, that radiates to the tops of her shoulder, and down her right arm to her fingertips. She reports she has occasional left arm pain to her fingertipes as well. She reports pain has been occurring for years and years. She denies any accident or injury that caused her pain. She states she had a antiror fusion in 2019, and it did not help her pain at all. She states she actually feels like it made it worse. She describes her neck pain as sharp pain occasionally, with constant aching and nagging  pain. She states she gets numbness, tingling down her arms. She does report she has been dropping things at work and feels like she has decreased sensation in her fingertips. She states that pain is worse with lifting, raising her arms, house work, showering, using the bathroom, work. 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 She states pain is somewhat better with certain positions, heat, but nothing lasts. She states she is not sleeping much due to pain, and that she does get migraines from her neck pain. She does note that pain is worse in the morning, but can be just as bad throughout the day. She reports that she did physical therapy after her surgery, but none since. She reports that she tries to be active, but does not do a specific program.     She is also complaining of bilateral

## 2025-06-26 ENCOUNTER — APPOINTMENT (OUTPATIENT)
Dept: GENERAL RADIOLOGY | Age: 55
End: 2025-06-26
Attending: ANESTHESIOLOGY
Payer: COMMERCIAL

## 2025-06-26 ENCOUNTER — HOSPITAL ENCOUNTER (OUTPATIENT)
Age: 55
Setting detail: OUTPATIENT SURGERY
Discharge: HOME | End: 2025-06-26
Attending: ANESTHESIOLOGY | Admitting: ANESTHESIOLOGY
Payer: COMMERCIAL

## 2025-06-26 VITALS
SYSTOLIC BLOOD PRESSURE: 125 MMHG | RESPIRATION RATE: 12 BRPM | BODY MASS INDEX: 30.72 KG/M2 | HEART RATE: 69 BPM | OXYGEN SATURATION: 96 % | HEIGHT: 65 IN | TEMPERATURE: 97.2 F | DIASTOLIC BLOOD PRESSURE: 77 MMHG | WEIGHT: 184.4 LBS

## 2025-06-26 PROCEDURE — 2709999900 HC NON-CHARGEABLE SUPPLY: Performed by: ANESTHESIOLOGY

## 2025-06-26 PROCEDURE — 6360000002 HC RX W HCPCS: Performed by: ANESTHESIOLOGY

## 2025-06-26 PROCEDURE — 3600000057 HC PAIN LEVEL 4 ADDL 15 MIN: Performed by: ANESTHESIOLOGY

## 2025-06-26 PROCEDURE — 7100000011 HC PHASE II RECOVERY - ADDTL 15 MIN: Performed by: ANESTHESIOLOGY

## 2025-06-26 PROCEDURE — 3600000056 HC PAIN LEVEL 4 BASE: Performed by: ANESTHESIOLOGY

## 2025-06-26 PROCEDURE — 64633 DESTROY CERV/THOR FACET JNT: CPT | Performed by: ANESTHESIOLOGY

## 2025-06-26 PROCEDURE — 99152 MOD SED SAME PHYS/QHP 5/>YRS: CPT | Performed by: ANESTHESIOLOGY

## 2025-06-26 PROCEDURE — 64634 DESTROY C/TH FACET JNT ADDL: CPT | Performed by: ANESTHESIOLOGY

## 2025-06-26 PROCEDURE — 7100000010 HC PHASE II RECOVERY - FIRST 15 MIN: Performed by: ANESTHESIOLOGY

## 2025-06-26 RX ORDER — LIDOCAINE HYDROCHLORIDE 20 MG/ML
INJECTION, SOLUTION EPIDURAL; INFILTRATION; INTRACAUDAL; PERINEURAL PRN
Status: DISCONTINUED | OUTPATIENT
Start: 2025-06-26 | End: 2025-06-26 | Stop reason: ALTCHOICE

## 2025-06-26 RX ORDER — MIDAZOLAM HYDROCHLORIDE 1 MG/ML
INJECTION, SOLUTION INTRAMUSCULAR; INTRAVENOUS PRN
Status: DISCONTINUED | OUTPATIENT
Start: 2025-06-26 | End: 2025-06-26 | Stop reason: ALTCHOICE

## 2025-06-26 RX ORDER — LIDOCAINE HYDROCHLORIDE 10 MG/ML
INJECTION, SOLUTION EPIDURAL; INFILTRATION; INTRACAUDAL; PERINEURAL PRN
Status: DISCONTINUED | OUTPATIENT
Start: 2025-06-26 | End: 2025-06-26 | Stop reason: ALTCHOICE

## 2025-06-26 RX ORDER — FENTANYL CITRATE 50 UG/ML
INJECTION, SOLUTION INTRAMUSCULAR; INTRAVENOUS PRN
Status: DISCONTINUED | OUTPATIENT
Start: 2025-06-26 | End: 2025-06-26 | Stop reason: ALTCHOICE

## 2025-06-26 ASSESSMENT — PAIN - FUNCTIONAL ASSESSMENT
PAIN_FUNCTIONAL_ASSESSMENT: 0-10
PAIN_FUNCTIONAL_ASSESSMENT: PREVENTS OR INTERFERES SOME ACTIVE ACTIVITIES AND ADLS
PAIN_FUNCTIONAL_ASSESSMENT: 0-10

## 2025-06-26 ASSESSMENT — PAIN DESCRIPTION - DESCRIPTORS: DESCRIPTORS: ACHING

## 2025-06-26 ASSESSMENT — PAIN SCALES - GENERAL: PAINLEVEL_OUTOF10: 9

## 2025-06-26 NOTE — PRE SEDATION
[facility]  Pre-Sedation/Analgesia History & Physical    Pt Name: Estefania Gutiérrez  MRN: [id number]  YOB: 1970  Provider Performing Procedure: Sandro Vivas DO   Primary Care Physician: Keyla Santos APRN - CNP      MEDICAL HISTORY       has a past medical history of Arthritis, GERD (gastroesophageal reflux disease), and Migraines.  SURGICAL HISTORY   has a past surgical history that includes  section; Hysterectomy (); cervical fusion (); TRA US GUIDED BREAST BIOPSY W LOC DEVICE 1ST LESION RIGHT (Right, 10/21/2021); Salpingo-oophorectomy (N/A, 2022); Colonoscopy (2022); Back Injection (Bilateral, 2024); Foot surgery (Right, 2024); Pain management procedure (N/A, 2025); Facet joint injection (Bilateral, 2025); Facet joint injection (Bilateral, 2025); and Pain management procedure (Bilateral, 2025).    ALLERGIES   Allergies as of 2025 - Fully Reviewed 2025   Allergen Reaction Noted    Oxycodone-acetaminophen Nausea And Vomiting 2022       MEDICATIONS   Prior to Admission medications    Medication Sig Start Date End Date Taking? Authorizing Provider   docusate sodium (COLACE) 100 MG capsule Take 1 capsule by mouth 2 times daily 25 Yes Keyla Santos APRN - CNP   oxyBUTYnin (DITROPAN XL) 5 MG extended release tablet Take 1 tablet by mouth daily 25  Yes Keyla Santos APRN - CNP   venlafaxine (EFFEXOR XR) 150 MG extended release capsule TAKE 1 CAPSULE BY MOUTH DAILY 25  Yes Keyla Santos APRN - CNP   celecoxib (CELEBREX) 200 MG capsule Take 1 capsule by mouth 2 times daily 25  Yes Provider, MD Luis   furosemide (LASIX) 20 MG tablet Take 1 tablet by mouth as needed (daily PRN for lower leg edema) 24  Yes Keyla Santos APRN - CNP   potassium chloride (KLOR-CON M) 10 MEQ extended release tablet Take 1 tablet by mouth as needed (take daily PRN

## 2025-06-26 NOTE — PROCEDURES
independently monitored by a Registered Nurse assigned to the procedure room  - Monitoring included automated blood pressure, continuous EKG, and continuous pulse oximetry  - The detailed conscious record is permanently stored in the Hospital Information System  - The following is the conscious sedation record:  Start Time: 12:22  End Time : 12:37  Duration: 15 minutes   Medications Administered: 2 mg Versed, 100 mcg Fentanyl         Sandro Vivas DO  Interventional Pain Management/PM&R   [facility] and [facility]

## 2025-06-26 NOTE — PROGRESS NOTES
1232-  Patient arrived to phase II via cart.  Spontaneous respiraitons even and unlabored.  Placed on monitor--VSS.  Report received from Surgical RN.    1233-  Assessment completed.  Patient is alert and oriented x4.  IV capped off-- no complications.  Patient states pain 3/10, ice pack applied--will monitor.  Injection sites clean and dry.     1235-  Snack and drink give to patient.   1245-  All belongings in room.   1250-  IV removed-- no complications.  Bandage applied.   1300-  Patient dressed.   1305-  Patient discharged in stable condition with all belongings.  This RN walked patient to car.

## 2025-06-26 NOTE — POST SEDATION
[facility]  Sedation/Analgesia Post Sedation Record    Pt Name: Estefania Gutiérrez  MRN: [id number]  YOB: 1970  Procedure Performed By: Sandro Vivas DO  Primary Care Physician: Keyla Santos, APRN - CNP    POST-PROCEDURE    Physicians/Assistants: Sandro Vivas DO  Procedure Performed: See Procedure Note   Sedation/Anesthesia: Versed and Fentanyl (See procedure note for amount and duration)  Estimated Blood Loss:     0  ml  Specimens Removed: None        Complications: None           Sandro Vivas DO  Electronically signed 6/26/2025 at 7:37 PM

## 2025-07-24 ENCOUNTER — OFFICE VISIT (OUTPATIENT)
Dept: PHYSICAL MEDICINE AND REHAB | Age: 55
End: 2025-07-24
Payer: COMMERCIAL

## 2025-07-24 VITALS
DIASTOLIC BLOOD PRESSURE: 80 MMHG | HEIGHT: 65 IN | BODY MASS INDEX: 30.71 KG/M2 | SYSTOLIC BLOOD PRESSURE: 128 MMHG | WEIGHT: 184.3 LBS

## 2025-07-24 DIAGNOSIS — M25.561 CHRONIC PAIN OF BOTH KNEES: ICD-10-CM

## 2025-07-24 DIAGNOSIS — M79.2 NEUROPATHIC PAIN: ICD-10-CM

## 2025-07-24 DIAGNOSIS — M54.12 CERVICAL RADICULITIS: ICD-10-CM

## 2025-07-24 DIAGNOSIS — M54.12 CERVICAL RADICULOPATHY: ICD-10-CM

## 2025-07-24 DIAGNOSIS — M53.3 SI (SACROILIAC) JOINT DYSFUNCTION: ICD-10-CM

## 2025-07-24 DIAGNOSIS — M47.812 CERVICAL SPONDYLOSIS: ICD-10-CM

## 2025-07-24 DIAGNOSIS — M53.3 SACROILIAC JOINT PAIN: Primary | ICD-10-CM

## 2025-07-24 DIAGNOSIS — M47.812 FACET HYPERTROPHY OF CERVICAL REGION: ICD-10-CM

## 2025-07-24 DIAGNOSIS — M79.18 MYOFASCIAL PAIN: ICD-10-CM

## 2025-07-24 DIAGNOSIS — Z98.1 S/P CERVICAL SPINAL FUSION: ICD-10-CM

## 2025-07-24 DIAGNOSIS — M47.816 FACET HYPERTROPHY OF LUMBAR REGION: ICD-10-CM

## 2025-07-24 DIAGNOSIS — G89.29 CHRONIC PAIN OF BOTH KNEES: ICD-10-CM

## 2025-07-24 DIAGNOSIS — M47.816 LUMBAR SPONDYLOSIS: ICD-10-CM

## 2025-07-24 DIAGNOSIS — G89.4 CHRONIC PAIN SYNDROME: ICD-10-CM

## 2025-07-24 DIAGNOSIS — M54.16 LUMBAR RADICULOPATHY: ICD-10-CM

## 2025-07-24 DIAGNOSIS — M25.562 CHRONIC PAIN OF BOTH KNEES: ICD-10-CM

## 2025-07-24 PROCEDURE — 99214 OFFICE O/P EST MOD 30 MIN: CPT | Performed by: NURSE PRACTITIONER

## 2025-07-24 NOTE — PROGRESS NOTES
[facility] PROFESSIONAL SERVS  [facility] NEUROSCIENCE AND REHABILITATION [address]  [facility] [address]  Dept: [phone]  Dept Fax: [phone]  Loc: [phone]    Visit Date: 7/24/2025    Functionality Assessment/Goals Worksheet     On a scale of 0 (Does not Interfere) to 10 (Completely Interferes)     1.  Which number describes how during the past week pain has interfered with       the following:  A.  General Activity:  8  B.  Mood: 8  C.  Walking Ability:  8  D.  Normal Work (Includes both work outside the home and housework):  7  E.  Relations with Other People:   9  F.  Sleep:   7  G.  Enjoyment of Life:   6    2.  Patient Prefers to Take their Pain Medications:     []  On a regular basis   []  Only when necessary    [x]  Does not take pain medications    3.  What are the Patient's Goals/Expectations for Visiting Pain Management?     []  Learn about my pain    []  Receive Medication   []  Physical Therapy     []  Treat Depression   [x]  Receive Injections    []  Treat Sleep   []  Deal with Anxiety and Stress   []  Treat Opoid Dependence/Addiction   []  Other:  
turning her head, looking up and down, participating in daily activity, and sleeping at night with pain much more controlled.  She states she did notice that she had a very mild headache for the first day with this as well after the procedure, but then it went away and did not really bother her much at all.  She states she would like to continue working with the injection series, and complete the neck step.  She is asking what the next step entails, and what the expectations are.  She states she also continues with her diclofenac, does not need any refills at this time.    Today, 7/24/2025, patient presents for planned procedure follow-up.  She completed bilateral cervical radiofrequency ablation targeting C4-5, C5-6 with Dr. Vivas on 6/26/2025.  She states procedure went very well, and she started noticing about 2 weeks after that she was getting great relief of her neck pain.  She states she feels like she is getting at least 90% relief for more at this time in her neck and she is very pleased with these results.  She states now she has been noticing that her low back is becoming more bothersome.  She states that is both sides of her low back that radiates down into her buttocks and down her posterior thighs to about her knee, right side is worse than left.  She states she will also get pain around her belt line and up that is bothersome as well.  She states her pain is intense at times, aching, severe sharp, tightness.  She states that it is worse when she is working around the house, doing her laundry, sweeping, bending, lifting, walking, standing.  She states it somewhat better when she is resting, or hot shower or heating pad.  She states she continues to take the diclofenac with some relief, but this low back pain and buttocks pain is becoming more bothersome for her.  She states she does have foot surgery coming up with Dr. Godfrey next month, so she is wonder if we have any options to get injections

## 2025-08-02 ENCOUNTER — HOSPITAL ENCOUNTER (OUTPATIENT)
Age: 55
Discharge: HOME | End: 2025-08-02
Payer: COMMERCIAL

## 2025-08-02 LAB
ANION GAP SERPL CALC-SCNC: 11 MEQ/L (ref 8–16)
BASOPHILS ABSOLUTE: 0 THOU/MM3 (ref 0–0.1)
BASOPHILS NFR BLD AUTO: 0.9 %
BUN SERPL-MCNC: 14 MG/DL (ref 8–23)
CALCIUM SERPL-MCNC: 9.3 MG/DL (ref 8.6–10)
CHLORIDE SERPL-SCNC: 107 MEQ/L (ref 98–111)
CO2 SERPL-SCNC: 24 MEQ/L (ref 22–29)
CREAT SERPL-MCNC: 0.8 MG/DL (ref 0.5–0.9)
DEPRECATED RDW RBC AUTO: 44.5 FL (ref 35–45)
EKG ATRIAL RATE: 68 BPM
EKG P AXIS: 56 DEGREES
EKG P-R INTERVAL: 188 MS
EKG Q-T INTERVAL: 400 MS
EKG QRS DURATION: 86 MS
EKG QTC CALCULATION (BAZETT): 425 MS
EKG R AXIS: 36 DEGREES
EKG T AXIS: 46 DEGREES
EKG VENTRICULAR RATE: 68 BPM
EOSINOPHIL NFR BLD AUTO: 2.6 %
EOSINOPHILS ABSOLUTE: 0.1 THOU/MM3 (ref 0–0.4)
ERYTHROCYTE [DISTWIDTH] IN BLOOD BY AUTOMATED COUNT: 13 % (ref 11.5–14.5)
GFR SERPL CREATININE-BSD FRML MDRD: 86 ML/MIN/1.73M2
GLUCOSE SERPL-MCNC: 88 MG/DL (ref 74–109)
HCT VFR BLD AUTO: 40.4 % (ref 37–47)
HGB BLD-MCNC: 13.2 GM/DL (ref 12–16)
IMM GRANULOCYTES # BLD AUTO: 0.01 THOU/MM3 (ref 0–0.07)
IMM GRANULOCYTES NFR BLD AUTO: 0.2 %
LYMPHOCYTES ABSOLUTE: 2.3 THOU/MM3 (ref 1–4.8)
LYMPHOCYTES NFR BLD AUTO: 42 %
MCH RBC QN AUTO: 30.4 PG (ref 26–33)
MCHC RBC AUTO-ENTMCNC: 32.7 GM/DL (ref 32.2–35.5)
MCV RBC AUTO: 93.1 FL (ref 81–99)
MONOCYTES ABSOLUTE: 0.3 THOU/MM3 (ref 0.4–1.3)
MONOCYTES NFR BLD AUTO: 5.7 %
NEUTROPHILS ABSOLUTE: 2.7 THOU/MM3 (ref 1.8–7.7)
NEUTROPHILS NFR BLD AUTO: 48.6 %
NRBC BLD AUTO-RTO: 0 /100 WBC
PLATELET # BLD AUTO: 303 THOU/MM3 (ref 130–400)
PMV BLD AUTO: 10.7 FL (ref 9.4–12.4)
POTASSIUM SERPL-SCNC: 3.8 MEQ/L (ref 3.5–5.2)
RBC # BLD AUTO: 4.34 MILL/MM3 (ref 4.2–5.4)
SODIUM SERPL-SCNC: 142 MEQ/L (ref 135–145)
WBC # BLD AUTO: 5.5 THOU/MM3 (ref 4.8–10.8)

## 2025-08-02 PROCEDURE — 93005 ELECTROCARDIOGRAM TRACING: CPT | Performed by: PODIATRIST

## 2025-08-02 PROCEDURE — 36415 COLL VENOUS BLD VENIPUNCTURE: CPT

## 2025-08-02 PROCEDURE — 85025 COMPLETE CBC W/AUTO DIFF WBC: CPT

## 2025-08-02 PROCEDURE — 93010 ELECTROCARDIOGRAM REPORT: CPT | Performed by: INTERNAL MEDICINE

## 2025-08-02 PROCEDURE — 80048 BASIC METABOLIC PNL TOTAL CA: CPT

## 2025-08-20 ENCOUNTER — APPOINTMENT (OUTPATIENT)
Dept: GENERAL RADIOLOGY | Age: 55
End: 2025-08-20
Attending: ANESTHESIOLOGY
Payer: COMMERCIAL

## 2025-08-20 ENCOUNTER — HOSPITAL ENCOUNTER (OUTPATIENT)
Age: 55
Setting detail: OUTPATIENT SURGERY
Discharge: HOME | End: 2025-08-20
Attending: ANESTHESIOLOGY | Admitting: ANESTHESIOLOGY
Payer: COMMERCIAL

## 2025-08-20 VITALS
HEART RATE: 66 BPM | OXYGEN SATURATION: 96 % | TEMPERATURE: 97.5 F | HEIGHT: 65 IN | WEIGHT: 183 LBS | DIASTOLIC BLOOD PRESSURE: 62 MMHG | RESPIRATION RATE: 16 BRPM | SYSTOLIC BLOOD PRESSURE: 112 MMHG | BODY MASS INDEX: 30.49 KG/M2

## 2025-08-20 PROCEDURE — 6360000004 HC RX CONTRAST MEDICATION: Performed by: ANESTHESIOLOGY

## 2025-08-20 PROCEDURE — 99152 MOD SED SAME PHYS/QHP 5/>YRS: CPT | Performed by: ANESTHESIOLOGY

## 2025-08-20 PROCEDURE — 7100000010 HC PHASE II RECOVERY - FIRST 15 MIN: Performed by: ANESTHESIOLOGY

## 2025-08-20 PROCEDURE — 6360000002 HC RX W HCPCS: Performed by: ANESTHESIOLOGY

## 2025-08-20 PROCEDURE — 7100000011 HC PHASE II RECOVERY - ADDTL 15 MIN: Performed by: ANESTHESIOLOGY

## 2025-08-20 PROCEDURE — 3600000054 HC PAIN LEVEL 3 BASE: Performed by: ANESTHESIOLOGY

## 2025-08-20 PROCEDURE — 2709999900 HC NON-CHARGEABLE SUPPLY: Performed by: ANESTHESIOLOGY

## 2025-08-20 RX ORDER — FENTANYL CITRATE 50 UG/ML
INJECTION, SOLUTION INTRAMUSCULAR; INTRAVENOUS PRN
Status: DISCONTINUED | OUTPATIENT
Start: 2025-08-20 | End: 2025-08-20 | Stop reason: ALTCHOICE

## 2025-08-20 RX ORDER — BUPIVACAINE HYDROCHLORIDE 5 MG/ML
INJECTION, SOLUTION PERINEURAL PRN
Status: DISCONTINUED | OUTPATIENT
Start: 2025-08-20 | End: 2025-08-20 | Stop reason: ALTCHOICE

## 2025-08-20 RX ORDER — IOPAMIDOL 612 MG/ML
INJECTION, SOLUTION INTRAVASCULAR PRN
Status: DISCONTINUED | OUTPATIENT
Start: 2025-08-20 | End: 2025-08-20 | Stop reason: ALTCHOICE

## 2025-08-20 RX ORDER — METHYLPREDNISOLONE ACETATE 80 MG/ML
INJECTION, SUSPENSION INTRA-ARTICULAR; INTRALESIONAL; INTRAMUSCULAR; SOFT TISSUE PRN
Status: DISCONTINUED | OUTPATIENT
Start: 2025-08-20 | End: 2025-08-20 | Stop reason: ALTCHOICE

## 2025-08-20 RX ORDER — MIDAZOLAM HYDROCHLORIDE 1 MG/ML
INJECTION, SOLUTION INTRAMUSCULAR; INTRAVENOUS PRN
Status: DISCONTINUED | OUTPATIENT
Start: 2025-08-20 | End: 2025-08-20 | Stop reason: ALTCHOICE

## 2025-08-20 RX ORDER — LIDOCAINE HYDROCHLORIDE 10 MG/ML
INJECTION, SOLUTION EPIDURAL; INFILTRATION; INTRACAUDAL; PERINEURAL PRN
Status: DISCONTINUED | OUTPATIENT
Start: 2025-08-20 | End: 2025-08-20 | Stop reason: ALTCHOICE

## 2025-08-20 ASSESSMENT — PAIN - FUNCTIONAL ASSESSMENT
PAIN_FUNCTIONAL_ASSESSMENT: 0-10
PAIN_FUNCTIONAL_ASSESSMENT: 0-10
PAIN_FUNCTIONAL_ASSESSMENT: PREVENTS OR INTERFERES SOME ACTIVE ACTIVITIES AND ADLS

## 2025-08-20 ASSESSMENT — PAIN DESCRIPTION - DESCRIPTORS: DESCRIPTORS: ACHING

## 2025-08-21 PROBLEM — M53.3 CHRONIC SI JOINT PAIN: Status: ACTIVE | Noted: 2025-08-21

## 2025-08-21 PROBLEM — G89.29 CHRONIC SI JOINT PAIN: Status: ACTIVE | Noted: 2025-08-21

## (undated) DEVICE — Device

## (undated) DEVICE — BANDAGE ADH W1XL3IN NAT FAB WVN FLX DURABLE N ADH PD SEAL

## (undated) DEVICE — HYPODERMIC SAFETY NEEDLE: Brand: MAGELLAN

## (undated) DEVICE — SC PAIN PACK: Brand: MEDLINE INDUSTRIES, INC.

## (undated) DEVICE — SYRINGE MED 7ML PLAS LUERSLIP LOSS OF RESISTANCE EPILOR

## (undated) DEVICE — SYRINGE MED 10ML LUERLOCK TIP W/O SFTY DISP

## (undated) DEVICE — APPLICATOR MEDICATED 26 CC SOLUTION HI LT ORNG CHLORAPREP

## (undated) DEVICE — NEEDLE HYPO 25GA L1.5IN ROBUST SFTY SHLD SELF LEVELING SHTH

## (undated) DEVICE — GLOVE ORANGE PI 8 1/2   MSG9085

## (undated) DEVICE — SUTURE VCRL + SZ 4-0 L27IN ABSRB WHT FS-2 3/8 CIR REV CUT VCP422H

## (undated) DEVICE — 6 ML SYRINGE LUER-LOCK TIP: Brand: MONOJECT

## (undated) DEVICE — SUTURE VCRL + SZ 0 L27IN ABSRB VLT L26MM CT-2 1/2 CIR VCP334H

## (undated) DEVICE — BAG RETRIEVAL SPECIMEN SUPERBAG 5 SMALL NYLON ITRODUCER

## (undated) DEVICE — SYRINGE MEDICAL 3ML CLEAR PLASTIC STANDARD NON CONTROL LUERLOCK TIP DISPOSABLE

## (undated) DEVICE — SOLUTION ANTIFOG VIS SYS CLEARIFY LAPSCP

## (undated) DEVICE — BARRIER ADH W3XL4IN UTER PELV ABSRB GYNECARE INTCEED

## (undated) DEVICE — TRI-LUMEN FILTERED TUBE SET WITH ACTIVATED CHARCOAL FILTER: Brand: AIRSEAL

## (undated) DEVICE — COVER EQUIP STEEP TREND EZ CLN UP WTRPRF DISP FOR STD SZ

## (undated) DEVICE — STRIP,CLOSURE,WOUND,MEDI-STRIP,1/2X4: Brand: MEDLINE

## (undated) DEVICE — BLADELESS OBTURATOR: Brand: WECK VISTA

## (undated) DEVICE — GLOVE SURG SZ 65 THK91MIL LTX FREE SYN POLYISOPRENE

## (undated) DEVICE — SUTURE VIC COAT BR UD NDL CTB-1 0 27 JB260

## (undated) DEVICE — TIP COVER ACCESSORY

## (undated) DEVICE — ARM DRAPE

## (undated) DEVICE — COLUMN DRAPE

## (undated) DEVICE — NEEDLE EPI L3.5IN OD20GA CLR POLYCARB HUB WNG N DEHP TUOHY

## (undated) DEVICE — CANNULA SEAL

## (undated) DEVICE — NEEDLE SPNL 22GA L3.5IN BLK HUB S STL REG WALL FIT STYL

## (undated) DEVICE — SYRINGE MED 5ML STD CLR PLAS LUERLOCK TIP N CTRL DISP

## (undated) DEVICE — AIRSEAL 8 MM ACCESS PORT AND LOW PROFILE OBTURATOR WITH BLADELESS OPTICAL TIP, 120 MM LENGTH: Brand: AIRSEAL

## (undated) DEVICE — PACK PROCEDURE SURG GYN ROBOTIC

## (undated) DEVICE — ELECTRO LUBE IS A SINGLE PATIENT USE DEVICE THAT IS INTENDED TO BE USED ON ELECTROSURGICAL ELECTRODES TO REDUCE STICKING.: Brand: KEY SURGICAL ELECTRO LUBE

## (undated) DEVICE — MARKER,SKIN,WI/RULER AND LABELS: Brand: MEDLINE

## (undated) DEVICE — BASIC SINGLE BASIN BTC-LF: Brand: MEDLINE INDUSTRIES, INC.